# Patient Record
Sex: FEMALE | Race: WHITE | NOT HISPANIC OR LATINO | Employment: OTHER | URBAN - METROPOLITAN AREA
[De-identification: names, ages, dates, MRNs, and addresses within clinical notes are randomized per-mention and may not be internally consistent; named-entity substitution may affect disease eponyms.]

---

## 2023-08-24 ENCOUNTER — TELEPHONE (OUTPATIENT)
Dept: PRIMARY CARE | Facility: CLINIC | Age: 63
End: 2023-08-24
Payer: COMMERCIAL

## 2023-08-24 DIAGNOSIS — E55.9 VITAMIN D DEFICIENCY: ICD-10-CM

## 2023-08-24 DIAGNOSIS — Z00.00 ROUTINE GENERAL MEDICAL EXAMINATION AT A HEALTH CARE FACILITY: Primary | ICD-10-CM

## 2023-09-27 ENCOUNTER — CLINICAL SUPPORT (OUTPATIENT)
Dept: PRIMARY CARE | Facility: CLINIC | Age: 63
End: 2023-09-27
Payer: COMMERCIAL

## 2023-09-27 DIAGNOSIS — E55.9 VITAMIN D DEFICIENCY: ICD-10-CM

## 2023-09-27 DIAGNOSIS — Z00.00 ROUTINE GENERAL MEDICAL EXAMINATION AT A HEALTH CARE FACILITY: ICD-10-CM

## 2023-09-27 LAB
ALANINE AMINOTRANSFERASE (SGPT) (U/L) IN SER/PLAS: 14 U/L (ref 7–45)
ALBUMIN (G/DL) IN SER/PLAS: 4.7 G/DL (ref 3.4–5)
ALKALINE PHOSPHATASE (U/L) IN SER/PLAS: 84 U/L (ref 33–136)
ANION GAP IN SER/PLAS: 16 MMOL/L (ref 10–20)
ASPARTATE AMINOTRANSFERASE (SGOT) (U/L) IN SER/PLAS: 20 U/L (ref 9–39)
BILIRUBIN TOTAL (MG/DL) IN SER/PLAS: 0.5 MG/DL (ref 0–1.2)
CALCIDIOL (25 OH VITAMIN D3) (NG/ML) IN SER/PLAS: 41 NG/ML
CALCIUM (MG/DL) IN SER/PLAS: 10.3 MG/DL (ref 8.6–10.6)
CARBON DIOXIDE, TOTAL (MMOL/L) IN SER/PLAS: 27 MMOL/L (ref 21–32)
CHLORIDE (MMOL/L) IN SER/PLAS: 105 MMOL/L (ref 98–107)
CHOLESTEROL (MG/DL) IN SER/PLAS: 201 MG/DL (ref 0–199)
CHOLESTEROL IN HDL (MG/DL) IN SER/PLAS: 65.2 MG/DL
CHOLESTEROL/HDL RATIO: 3.1
COBALAMIN (VITAMIN B12) (PG/ML) IN SER/PLAS: 714 PG/ML (ref 211–911)
CREATININE (MG/DL) IN SER/PLAS: 0.76 MG/DL (ref 0.5–1.05)
ERYTHROCYTE DISTRIBUTION WIDTH (RATIO) BY AUTOMATED COUNT: 12.1 % (ref 11.5–14.5)
ERYTHROCYTE MEAN CORPUSCULAR HEMOGLOBIN CONCENTRATION (G/DL) BY AUTOMATED: 31.2 G/DL (ref 32–36)
ERYTHROCYTE MEAN CORPUSCULAR VOLUME (FL) BY AUTOMATED COUNT: 95 FL (ref 80–100)
ERYTHROCYTES (10*6/UL) IN BLOOD BY AUTOMATED COUNT: 4.55 X10E12/L (ref 4–5.2)
ESTIMATED AVERAGE GLUCOSE FOR HBA1C: 105 MG/DL
GFR FEMALE: 89 ML/MIN/1.73M2
GLUCOSE (MG/DL) IN SER/PLAS: 95 MG/DL (ref 74–99)
HEMATOCRIT (%) IN BLOOD BY AUTOMATED COUNT: 43.3 % (ref 36–46)
HEMOGLOBIN (G/DL) IN BLOOD: 13.5 G/DL (ref 12–16)
HEMOGLOBIN A1C/HEMOGLOBIN TOTAL IN BLOOD: 5.3 %
LDL: 112 MG/DL (ref 0–99)
LEUKOCYTES (10*3/UL) IN BLOOD BY AUTOMATED COUNT: 5.6 X10E9/L (ref 4.4–11.3)
NRBC (PER 100 WBCS) BY AUTOMATED COUNT: 0 /100 WBC (ref 0–0)
PLATELETS (10*3/UL) IN BLOOD AUTOMATED COUNT: 275 X10E9/L (ref 150–450)
POTASSIUM (MMOL/L) IN SER/PLAS: 4.9 MMOL/L (ref 3.5–5.3)
PROTEIN TOTAL: 7.1 G/DL (ref 6.4–8.2)
SODIUM (MMOL/L) IN SER/PLAS: 143 MMOL/L (ref 136–145)
THYROTROPIN (MIU/L) IN SER/PLAS BY DETECTION LIMIT <= 0.05 MIU/L: 6.48 MIU/L (ref 0.44–3.98)
THYROXINE (T4) FREE (NG/DL) IN SER/PLAS: 1.08 NG/DL (ref 0.78–1.48)
TRIGLYCERIDE (MG/DL) IN SER/PLAS: 119 MG/DL (ref 0–149)
UREA NITROGEN (MG/DL) IN SER/PLAS: 15 MG/DL (ref 6–23)
VLDL: 24 MG/DL (ref 0–40)

## 2023-09-27 PROCEDURE — 83036 HEMOGLOBIN GLYCOSYLATED A1C: CPT

## 2023-09-27 PROCEDURE — 82306 VITAMIN D 25 HYDROXY: CPT

## 2023-09-27 PROCEDURE — 80061 LIPID PANEL: CPT

## 2023-09-27 PROCEDURE — 84439 ASSAY OF FREE THYROXINE: CPT

## 2023-09-27 PROCEDURE — 84443 ASSAY THYROID STIM HORMONE: CPT

## 2023-09-27 PROCEDURE — 85027 COMPLETE CBC AUTOMATED: CPT

## 2023-09-27 PROCEDURE — 80053 COMPREHEN METABOLIC PANEL: CPT

## 2023-09-27 PROCEDURE — 82607 VITAMIN B-12: CPT

## 2023-10-02 ENCOUNTER — OFFICE VISIT (OUTPATIENT)
Dept: PRIMARY CARE | Facility: CLINIC | Age: 63
End: 2023-10-02
Payer: COMMERCIAL

## 2023-10-02 VITALS
SYSTOLIC BLOOD PRESSURE: 129 MMHG | DIASTOLIC BLOOD PRESSURE: 86 MMHG | OXYGEN SATURATION: 98 % | BODY MASS INDEX: 21.9 KG/M2 | HEART RATE: 68 BPM | WEIGHT: 119 LBS | HEIGHT: 62 IN

## 2023-10-02 DIAGNOSIS — C50.911 MALIGNANT NEOPLASM OF RIGHT FEMALE BREAST, UNSPECIFIED ESTROGEN RECEPTOR STATUS, UNSPECIFIED SITE OF BREAST (MULTI): ICD-10-CM

## 2023-10-02 DIAGNOSIS — Z00.00 ANNUAL PHYSICAL EXAM: Primary | ICD-10-CM

## 2023-10-02 DIAGNOSIS — E78.2 MODERATE MIXED HYPERLIPIDEMIA NOT REQUIRING STATIN THERAPY: ICD-10-CM

## 2023-10-02 DIAGNOSIS — Z23 ENCOUNTER FOR IMMUNIZATION: ICD-10-CM

## 2023-10-02 PROBLEM — E55.9 VITAMIN D DEFICIENCY: Status: ACTIVE | Noted: 2023-10-02

## 2023-10-02 PROBLEM — R87.619 ABNORMAL PAP SMEAR OF CERVIX: Status: ACTIVE | Noted: 2023-10-02

## 2023-10-02 PROBLEM — M54.12 CERVICAL RADICULOPATHY: Status: ACTIVE | Noted: 2023-10-02

## 2023-10-02 PROBLEM — K21.9 GERD WITHOUT ESOPHAGITIS: Status: ACTIVE | Noted: 2023-10-02

## 2023-10-02 PROBLEM — L71.9 ROSACEA, UNSPECIFIED: Status: ACTIVE | Noted: 2023-02-15

## 2023-10-02 PROBLEM — L23.1 ALLERGIC CONTACT DERMATITIS DUE TO ADHESIVES: Status: ACTIVE | Noted: 2023-02-15

## 2023-10-02 PROBLEM — R49.0 HOARSENESS, CHRONIC: Status: ACTIVE | Noted: 2023-10-02

## 2023-10-02 PROBLEM — I89.0 LYMPHEDEMA OF RIGHT ARM: Status: ACTIVE | Noted: 2023-10-02

## 2023-10-02 PROBLEM — E78.5 HYPERLIPEMIA: Status: ACTIVE | Noted: 2023-10-02

## 2023-10-02 PROCEDURE — 1036F TOBACCO NON-USER: CPT | Performed by: FAMILY MEDICINE

## 2023-10-02 PROCEDURE — 90471 IMMUNIZATION ADMIN: CPT | Performed by: FAMILY MEDICINE

## 2023-10-02 PROCEDURE — 99396 PREV VISIT EST AGE 40-64: CPT | Performed by: FAMILY MEDICINE

## 2023-10-02 PROCEDURE — 90686 IIV4 VACC NO PRSV 0.5 ML IM: CPT | Performed by: FAMILY MEDICINE

## 2023-10-02 RX ORDER — ROSUVASTATIN CALCIUM 5 MG/1
TABLET, COATED ORAL
COMMUNITY
Start: 2021-10-20 | End: 2023-10-02 | Stop reason: SDUPTHER

## 2023-10-02 RX ORDER — ROSUVASTATIN CALCIUM 5 MG/1
5 TABLET, COATED ORAL 3 TIMES WEEKLY
Qty: 39 TABLET | Refills: 3 | Status: SHIPPED | OUTPATIENT
Start: 2023-10-02

## 2023-10-02 RX ORDER — ACETAMINOPHEN 500 MG
TABLET ORAL
COMMUNITY

## 2023-10-02 RX ORDER — ANASTROZOLE 1 MG/1
TABLET ORAL
COMMUNITY
Start: 2018-05-30

## 2023-10-02 RX ORDER — CALCIUM CARBONATE 500(1250)
1 TABLET ORAL
COMMUNITY

## 2023-10-02 ASSESSMENT — COLUMBIA-SUICIDE SEVERITY RATING SCALE - C-SSRS
1. IN THE PAST MONTH, HAVE YOU WISHED YOU WERE DEAD OR WISHED YOU COULD GO TO SLEEP AND NOT WAKE UP?: NO
2. HAVE YOU ACTUALLY HAD ANY THOUGHTS OF KILLING YOURSELF?: NO

## 2023-10-02 ASSESSMENT — PATIENT HEALTH QUESTIONNAIRE - PHQ9
1. LITTLE INTEREST OR PLEASURE IN DOING THINGS: NOT AT ALL
SUM OF ALL RESPONSES TO PHQ9 QUESTIONS 1 AND 2: 0
2. FEELING DOWN, DEPRESSED OR HOPELESS: NOT AT ALL

## 2023-10-02 NOTE — PATIENT INSTRUCTIONS
Immunizations you need to get from the pharmacy: COVID booster    Scheduling Radiology tests: [phone]    If you need labs done, please go to any  lab, no paperwork needed. If a Lipid panel is ordered, you will need to fast overnight, other blood work does not require fasting.   Labs in this building are the one across the pierce (M-F 7:30-4pm) and in [address] (M-F 7:30-5:00pm and Sat 8-12pm)    Please call me if any questions arise from now until your next visit. I will call you after I am done seeing patients. A Doctor is always available by phone when the office is closed. Please feel free to call for help with any problem that you feel shouldn't wait until the office re-opens.

## 2023-10-02 NOTE — PROGRESS NOTES
"Subjective   Patient ID: Pallavi Wallace is a 62 y.o. female who presents for Annual Exam.    Last Annual Physical: Oct 2022  Last Dental Visit: Up-to-date  Last Eye exam: No recent dental visit   Hearing Concerns: No   Diet: Well- balanced   Exercise Routine: Regular exercise       HPI   The patient reports that she is taking rosuvastatin for hyperlipidemia 3 times weekly and anastrozole for her history of breast cancer. She is taking these medications as prescribed and is tolerating it well.     Review of Systems  Constitutional: No fever or chills, No Night Sweats  Eyes: No Blurry Vision or Eye sight problems  ENT: No Nasal Discharge, Hoarseness, sore throat  Cardiovascular: no chest pain, no palpitations and no syncope.   Respiratory: no cough, no shortness of breath during exertion and no shortness of breath at rest.   Gastrointestinal: no abdominal pain, no nausea and no vomiting.   : No vaginal discharge, burning with urination, no blood in urine or stools  Skin: No Skin rashes or Lesions  Neuro: No Headache, no dizziness or Numbness or tingling  Psych: No Anxiety, depression or sleeping problems  Heme: No Easy bleeding or brusing.     Objective   /86   Pulse 68   Ht 1.575 m (5' 2\")   Wt 54 kg (119 lb)   SpO2 98%   BMI 21.77 kg/m²     Physical Exam  Patient declined Chaperone  Constitutional: Alert and in no acute distress. Well developed, well nourished.   Head and Face: Head and face: Normal.    Eyes: Normal external exam. Pupils were equal in size, round, reactive to light (PERRL) with normal accommodation and extraocular movements intact (EOMI).   Ears, Nose, Mouth, and Throat: External inspection of ears and nose: Normal.  Hearing: Normal.  Nasal mucosa, septum, and turbinates: Normal.  Lips, teeth, and gums: Normal.  Oropharynx: Normal.   Neck: No neck mass was observed. Supple. Thyroid not enlarged and there were no palpable thyroid nodules.   Cardiovascular: Heart rate and rhythm were " normal, normal S1 and S2. Pedal pulses: Normal. No peripheral edema.   Pulmonary: No respiratory distress. Clear bilateral breath sounds.   Abdomen: Soft nontender; no abdominal mass palpated. Normal bowel sounds. No organomegaly.   Musculoskeletal: No joint swelling seen, normal movements of all extremities. Range of motion: Normal.  Muscle strength/tone: Normal.    Skin: Normal skin color and pigmentation, normal skin turgor, and no rash.   Neurologic: Deep tendon reflexes were 2+ and symmetric.   Psychiatric: Judgment and insight: Intact. Mood and affect: Normal.  Lymphatic: No cervical lymphadenopathy. Palpation of lymph nodes in axillae: Normal.  Palpation of lymph nodes in groin: Normal.    Lab Results   Component Value Date    WBC 5.6 09/27/2023    HGB 13.5 09/27/2023    HCT 43.3 09/27/2023     09/27/2023    CHOL 201 (H) 09/27/2023    TRIG 119 09/27/2023    HDL 65.2 09/27/2023    ALT 14 09/27/2023    AST 20 09/27/2023     09/27/2023    K 4.9 09/27/2023     09/27/2023    CREATININE 0.76 09/27/2023    BUN 15 09/27/2023    CO2 27 09/27/2023    TSH 6.48 (H) 09/27/2023    HGBA1C 5.3 09/27/2023       XR DEXA bone density  Narrative: Interpreted By:  JAZMINE FLORES DO  MRN: [id number]  Patient Name: ADDY GOODRICH     STUDY:  BONE DENSITY, DEXA 1 OR MORE SITES: AXIAL SKELETN8/25/2023 2:16 pm     INDICATION:  hx breast cancer, on aromatase inhibitor, osteopenia  M85.80:  Osteopenia C50.311: Malignant neoplasm of lower-inner quadrant of  right breast of female, estrogen receptor positive Z51.81: Encounter  for monitoring aromatase inhibitor therapy. The patient is a 63 y/o  year old F.     COMPARISON:  None.     ACCESSION NUMBER(S):  [id number]     ORDERING CLINICIAN:  NISREEN HARVEY     TECHNIQUE:  BONE DENSITY, DEXA 1 OR MORE SITES: AXIAL SKELETN     FINDINGS:  SPINE L1-L4  Bone Mineral Density: 0.903  T-Score -1.3  Z-Score 0.2  Classification:  Osteopenia  Bone Mineral Density change vs baseline:  Not  reported  Bone Mineral Density change vs previous: Not reported     LEFT FEMUR -TOTAL  Bone Mineral Density: 0.777  T-Score -1.4   Z-Score  -0.3  Classification:  Osteopenia  Bone Mineral Density change vs baseline: Not reported  Bone Mineral Density change vs previous: Not reported     LEFT FEMUR -NECK  Bone Mineral Density: 0.715  T-Score -1.2  Z-Score 0.2  Classification:  Osteopenia        World Health Organization (WHO) criteria for post-menopausal,   Women:  Normal:         T-score at or above -1 SD  Osteopenia:   T-score between -1 and -2.5 SD  Osteoporosis: T-score at or below -2.5 SD        10-year Fracture Risk:  Major Osteoporotic Fracture  7.1 %  Hip Fracture                        0.5 %     Note:  If no FRAX score is reported, it is because:  Some T-score for Spine Total or Hip Total or Femoral Neck at or below  -2.5     This exam was performed at [facility] on a [facility] Dexa Unit.     Impression: DEXA:  According to World Health Organization criteria,  classification is  low bone mass (osteopenia)     Followup recommended in two years or sooner as clinically warranted.     All images and detailed analysis are available on the  Radiology  PACS.     MACRO:  None      Assessment/Plan   Diagnoses and all orders for this visit:  Annual physical exam  Malignant neoplasm of right female breast, unspecified estrogen receptor status, unspecified site of breast (CMS/[facility])  Encounter for immunization  -     Flu vaccine (IIV4) age 6 months and greater, preservative free  Moderate mixed hyperlipidemia not requiring statin therapy  -     CT cardiac scoring wo IV contrast; Future  -     Lipoprotein a; Future  -     rosuvastatin (Crestor) 5 mg tablet; Take 1 tablet (5 mg) by mouth 3 (three) times a week.        Dear Pallavi Wallace     It was my pleasure to take care of you today in the office. Below are the things we discussed today:    1. 1. Immunizations: Yearly Flu shot is  recommended. Given today          a: COVID: Please get booster from the pharmacy          b: Tetanus: Up-to-date         c: Shingrix: Up-to-date    2. Blood Work: Reviewed   3. Seen your dentist twice a year  4. Yearly Eye exam is recommended    5. BMI: Normal  6: Diet recommendations:   Eat Clean, Try to have as many home cooked meals as possible  Avoid processed foods which contain excess calories, sugar, and sodium.    7. Exercise recommendations:   150 minutes a week to maintain your weight     If you have to loose weight, you need a better diet and exercise plan.     8. Supplements recommended:  a - Calcium 600 mg up to twice a day to get a total of 1200 mg. Each 8 oz of milk or yogurt or 1 oz of cheese, 1 Banana, 1 serving of green Leafy vegetable has about 300 mg of Calcium, so you may subtract that amount. Calcium citrate is the only acceptable supplement to take if you take an acid suppressing medication like Prilosec; otherwise Calcium carbonate is acceptable too (It can cause Constipation).   b - Vitamin D - 2000 IU daily     9. Please get your Living will / Advance directive completed if you do not have one already. Please make sure our office has a copy of the latest one.     10. Colonoscopy: Uptodate. Last done in Feb 2022, repeat in Feb 2032   11. Mammogram: Uptodate. The patient is following up with Breast center.  12. PAP: Abnormal PAP. The patient will follow up with GYN   13. DEXA: Up-to-date   14: Skin Check: Please see Dermatology once a year for a Skin Check.     15. Hyperlipidemia: Hold rosuvastatin for 2 weeks prior to getting labs done. Lipoprotein A ordered. CT Cardiac scoring ordered.    16. History of breast cancer: Continue anastrozole.     17. Osteopenia: Advised the patient to do strength training exercises and take over-the-counter calcium supplement.     Follow up in one year for a Physical. Please call the office before your Physical to see if you need blood work completed prior to  your physical.     Please call me if any questions arise from now until your next visit. I will call you after I am done seeing patients. A Doctor is always available by phone when the office is closed. Please feel free to call for help with any problem that you feel shouldn't wait until the office re-opens.     Scribe Attestation  By signing my name below, I, Shahana West, Cassandra   attest that this documentation has been prepared under the direction and in the presence of Rachel Reed MD.

## 2023-10-16 ENCOUNTER — HOSPITAL ENCOUNTER (OUTPATIENT)
Dept: RADIOLOGY | Facility: HOSPITAL | Age: 63
Discharge: HOME | End: 2023-10-16
Payer: COMMERCIAL

## 2023-10-16 DIAGNOSIS — E78.2 MODERATE MIXED HYPERLIPIDEMIA NOT REQUIRING STATIN THERAPY: ICD-10-CM

## 2023-10-19 ENCOUNTER — APPOINTMENT (OUTPATIENT)
Dept: RADIOLOGY | Facility: HOSPITAL | Age: 63
End: 2023-10-19
Payer: COMMERCIAL

## 2023-10-20 PROBLEM — K21.9 LPRD (LARYNGOPHARYNGEAL REFLUX DISEASE): Status: ACTIVE | Noted: 2023-10-20

## 2023-10-20 PROBLEM — J38.2 VOCAL CORD NODULE: Status: ACTIVE | Noted: 2023-10-20

## 2023-10-20 PROBLEM — N95.2 VAGINAL ATROPHY: Status: ACTIVE | Noted: 2023-10-20

## 2023-10-20 RX ORDER — TRETINOIN 0.25 MG/G
CREAM TOPICAL NIGHTLY
COMMUNITY
End: 2023-10-24 | Stop reason: DRUGHIGH

## 2023-10-23 ENCOUNTER — OFFICE VISIT (OUTPATIENT)
Dept: OBSTETRICS AND GYNECOLOGY | Facility: CLINIC | Age: 63
End: 2023-10-23
Payer: COMMERCIAL

## 2023-10-23 VITALS
HEIGHT: 62 IN | BODY MASS INDEX: 22.45 KG/M2 | DIASTOLIC BLOOD PRESSURE: 58 MMHG | WEIGHT: 122 LBS | SYSTOLIC BLOOD PRESSURE: 110 MMHG

## 2023-10-23 DIAGNOSIS — Z01.411 ENCNTR FOR GYN EXAM (GENERAL) (ROUTINE) W ABNORMAL FINDINGS: Primary | ICD-10-CM

## 2023-10-23 DIAGNOSIS — Z12.4 SCREENING FOR MALIGNANT NEOPLASM OF CERVIX: ICD-10-CM

## 2023-10-23 DIAGNOSIS — N95.8 GENITOURINARY SYNDROME OF MENOPAUSE: ICD-10-CM

## 2023-10-23 PROCEDURE — 99396 PREV VISIT EST AGE 40-64: CPT | Performed by: NURSE PRACTITIONER

## 2023-10-23 PROCEDURE — 1036F TOBACCO NON-USER: CPT | Performed by: NURSE PRACTITIONER

## 2023-10-23 PROCEDURE — 88175 CYTOPATH C/V AUTO FLUID REDO: CPT

## 2023-10-23 PROCEDURE — 87624 HPV HI-RISK TYP POOLED RSLT: CPT

## 2023-10-23 RX ORDER — ESTRADIOL 0.1 MG/G
CREAM VAGINAL
Qty: 42.5 G | Refills: 3 | Status: SHIPPED | OUTPATIENT
Start: 2023-10-23

## 2023-10-23 NOTE — PROGRESS NOTES
63 y.o.  female presents for annual well woman exam.    Denies abnormal bleeding. Reports periodic hot flashes, night sweats, but tolerable. Never on HRT.   Sexually active with male partner.  Denies dyspareunia.   Reports vaginal dryness, uses OTC lubricant with effect.   Declines STI testing.   She denies any breast changes. Hx of lumpectomy.   Pap hx.  LEEP 2022, due for pap today  Denies pelvic pain.   Denies abnormal vaginal discharge, itching, odor.   Endorses urinary leakage. States she is going to try Bloom.   She is a non-smoker

## 2023-10-23 NOTE — PROGRESS NOTES
Pallavi is a 64yo here today for annual exam      Hx significant for breast cancer and VASQUEZ 2-3 on LEEP    Pap hx:  2022: ASC-US, HPV neg  2022: LEEP VASQUEZ 2-3  2021: ASC-H  2019: ASC-US, HPV 16/other pos, Colpo No VASQUEZ  0549-8010: ASC-US, HPV neg  2008: NILM, HPV neg    Discussed hyst last year with Dr. Martinez    Physical Exam  Constitutional:       Appearance: Normal appearance.   Genitourinary:      Bladder and rectum normal.      Right Labia: No skin changes or Bartholin's cyst.     Left Labia: No skin changes or Bartholin's cyst.     Vulva exam comments: Normal.      No vaginal prolapse present.     Moderate vaginal atrophy present.     Vaginal exam comments: Normal.   Breasts:     Breasts are soft.     Right: Normal.      Left: Normal.   HENT:      Head: Normocephalic.   Pulmonary:      Effort: Pulmonary effort is normal.   Abdominal:      General: There is no distension.      Palpations: Abdomen is soft.   Musculoskeletal:         General: Normal range of motion.      Cervical back: Normal range of motion and neck supple.   Neurological:      General: No focal deficit present.      Mental Status: She is alert and oriented to person, place, and time.   Skin:     General: Skin is warm and dry.   Psychiatric:         Mood and Affect: Mood normal.         Behavior: Behavior normal.         Thought Content: Thought content normal.         Judgment: Judgment normal.   Vitals and nursing note reviewed.        Diagnoses and all orders for this visit:  Encntr for gyn exam (general) (routine) w abnormal findings  Genitourinary syndrome of menopause  -     estradiol (Estrace) 0.01 % (0.1 mg/gram) vaginal cream; Insert pea sized amount into vagina twice weekly  Screening for malignant neoplasm of cervix  -     THINPREP PAP

## 2023-10-24 ENCOUNTER — OFFICE VISIT (OUTPATIENT)
Dept: DERMATOLOGY | Facility: CLINIC | Age: 63
End: 2023-10-24
Payer: COMMERCIAL

## 2023-10-24 DIAGNOSIS — Z12.83 SCREENING EXAM FOR SKIN CANCER: ICD-10-CM

## 2023-10-24 DIAGNOSIS — L82.1 SEBORRHEIC KERATOSIS: ICD-10-CM

## 2023-10-24 DIAGNOSIS — L71.9 ROSACEA: ICD-10-CM

## 2023-10-24 DIAGNOSIS — D22.9 MULTIPLE BENIGN NEVI: Primary | ICD-10-CM

## 2023-10-24 DIAGNOSIS — L81.4 LENTIGO: ICD-10-CM

## 2023-10-24 DIAGNOSIS — L30.9 DERMATITIS: ICD-10-CM

## 2023-10-24 PROCEDURE — 1036F TOBACCO NON-USER: CPT | Performed by: DERMATOLOGY

## 2023-10-24 PROCEDURE — 99213 OFFICE O/P EST LOW 20 MIN: CPT | Performed by: DERMATOLOGY

## 2023-10-24 RX ORDER — TRETINOIN 0.5 MG/G
CREAM TOPICAL NIGHTLY
Qty: 45 G | Refills: 3 | Status: SHIPPED | OUTPATIENT
Start: 2023-10-24 | End: 2024-10-23

## 2023-10-24 ASSESSMENT — DERMATOLOGY PATIENT ASSESSMENT
ARE YOU TRYING TO GET PREGNANT: NO
HAVE YOU HAD OR DO YOU HAVE VASCULAR DISEASE: NO
DO YOU USE A TANNING BED: NO
HAVE YOU HAD OR DO YOU HAVE A STAPH INFECTION: NO
DO YOU HAVE IRREGULAR MENSTRUAL CYCLES: NO
ARE YOU ON BIRTH CONTROL: NO
DO YOU USE SUNSCREEN: DAILY
DO YOU HAVE ANY NEW OR CHANGING LESIONS: NO
ARE YOU AN ORGAN TRANSPLANT RECIPIENT: NO

## 2023-10-24 ASSESSMENT — ENCOUNTER SYMPTOMS
SWEATS: 0
CHILLS: 0
WEIGHT LOSS: 0
SHORTNESS OF BREATH: 0
WHEEZING: 0
SORE THROAT: 0
HEADACHES: 0
FEVER: 0
COUGH: 1
RHINORRHEA: 0
HEMOPTYSIS: 0
MYALGIAS: 1
HEARTBURN: 0

## 2023-10-24 ASSESSMENT — DERMATOLOGY QUALITY OF LIFE (QOL) ASSESSMENT
RATE HOW BOTHERED YOU ARE BY EFFECTS OF YOUR SKIN PROBLEMS ON YOUR ACTIVITIES (EG, GOING OUT, ACCOMPLISHING WHAT YOU WANT, WORK ACTIVITIES OR YOUR RELATIONSHIPS WITH OTHERS): 0 - NEVER BOTHERED
DATE THE QUALITY-OF-LIFE ASSESSMENT WAS COMPLETED: 66771
RATE HOW EMOTIONALLY BOTHERED YOU ARE BY YOUR SKIN PROBLEM (FOR EXAMPLE, WORRY, EMBARRASSMENT, FRUSTRATION): 0 - NEVER BOTHERED
ARE THERE EXCLUSIONS OR EXCEPTIONS FOR THE QUALITY OF LIFE ASSESSMENT: NO
RATE HOW BOTHERED YOU ARE BY SYMPTOMS OF YOUR SKIN PROBLEM (EG, ITCHING, STINGING BURNING, HURTING OR SKIN IRRITATION): 0 - NEVER BOTHERED

## 2023-10-24 ASSESSMENT — ITCH NUMERIC RATING SCALE: HOW SEVERE IS YOUR ITCHING?: 0

## 2023-10-24 ASSESSMENT — PATIENT GLOBAL ASSESSMENT (PGA): PATIENT GLOBAL ASSESSMENT: PATIENT GLOBAL ASSESSMENT:  1 - CLEAR

## 2023-10-24 NOTE — PROGRESS NOTES
Subjective     Pallavi Wallace is a 63 y.o. female who presents for the following: Skin Check.     Review of Systems:  No other skin or systemic complaints other than what is documented elsewhere in the note.    The following portions of the chart were reviewed this encounter and updated as appropriate:         Skin Cancer History  No skin cancer on file.      Specialty Problems          Dermatology Problems    Rosacea, unspecified        Objective   Well appearing patient in no apparent distress; mood and affect are within normal limits.    A full examination was performed including scalp, head, eyes, ears, nose, lips, neck, chest, axillae, abdomen, back, buttocks, bilateral upper extremities, bilateral lower extremities, hands, feet, fingers, toes, fingernails, and toenails. All findings within normal limits unless otherwise noted below.    Assessment/Plan   1. Multiple benign nevi  Scattered, uniform and benign-appearing, regular brown melanocytic papules and macules.    - Discussed benign nature and that no treatment is necessary unless it becomes painful or increases in size. Patient opts for clinical monitoring at this time.     2. Seborrheic keratosis  Stuck on verrucous, tan-brown papules and plaques.      - Discussed benign nature and that no treatment is necessary unless it becomes painful or increases in size. Patient opts for clinical monitoring at this time.     3. Lentigo  Scattered tan macules in sun-exposed areas.    - Discussed benign nature and that no treatment is necessary unless it becomes painful or increases in size. Patient opts for clinical monitoring at this time.     4. Screening exam for skin cancer    Full body skin exam  -No lesions concerning for malignancy on the remainder the skin exam today   - The ugly duckling sign was discussed. Monitor for any skin lesions that are different in color, shape, or size than others on body  -Sun protection was discussed. Recommend SPF 30+, hats  with brims, sun protective clothing, and avoiding sun exposure between 10 AM and 2 PM whenever possible  -Recommend regular skin exams or sooner if new or changing lesions       5. Dermatitis  Light pink macules coalescing into symmetric patches mostlyin intertriginous places in antecubital fossae, inverse on trunk, neck. Covering >30% BSA    Not itchy  Just started today after playing Ellie ball  Happened once before several months ago, resolved after 1-2 days spontaneously    No sick symptoms  Reviewed medicatons/supplements that she takes sporadically  Took advil, took a mushroom containing supplement        1 year FULL SKIN EXAM   Refills OK 1 year

## 2023-10-25 ENCOUNTER — OFFICE VISIT (OUTPATIENT)
Dept: PRIMARY CARE | Facility: CLINIC | Age: 63
End: 2023-10-25
Payer: COMMERCIAL

## 2023-10-25 VITALS
HEIGHT: 62 IN | DIASTOLIC BLOOD PRESSURE: 74 MMHG | HEART RATE: 85 BPM | BODY MASS INDEX: 21.53 KG/M2 | SYSTOLIC BLOOD PRESSURE: 125 MMHG | WEIGHT: 117 LBS | OXYGEN SATURATION: 95 %

## 2023-10-25 DIAGNOSIS — R05.8 POST-VIRAL COUGH SYNDROME: Primary | ICD-10-CM

## 2023-10-25 PROCEDURE — 1036F TOBACCO NON-USER: CPT | Performed by: FAMILY MEDICINE

## 2023-10-25 PROCEDURE — 99213 OFFICE O/P EST LOW 20 MIN: CPT | Performed by: FAMILY MEDICINE

## 2023-10-25 RX ORDER — BENZONATATE 100 MG/1
100 CAPSULE ORAL 3 TIMES DAILY PRN
Qty: 42 CAPSULE | Refills: 0 | Status: SHIPPED | OUTPATIENT
Start: 2023-10-25 | End: 2023-11-24

## 2023-10-25 ASSESSMENT — ENCOUNTER SYMPTOMS
COUGH: 1
RHINORRHEA: 0
SWEATS: 0
FEVER: 0
MYALGIAS: 1
HEARTBURN: 0
CHILLS: 0
WEIGHT LOSS: 0
HEADACHES: 0
WHEEZING: 0
SORE THROAT: 0
SHORTNESS OF BREATH: 0
HEMOPTYSIS: 0

## 2023-10-25 NOTE — PROGRESS NOTES
"Subjective   Patient ID: Pallavi Wallace is a 63 y.o. female who presents for Cough.    Cough  This is a recurrent problem. The current episode started 1 to 4 weeks ago. The problem has been gradually worsening. The problem occurs every few minutes. The cough is Non-productive. Associated symptoms include myalgias and a rash. Pertinent negatives include no chest pain, chills, ear congestion, ear pain, fever, headaches, heartburn, hemoptysis, nasal congestion, postnasal drip, rhinorrhea, sore throat, shortness of breath, sweats, weight loss or wheezing.      The patient states that she has been experiencing a worsening cough for the past 3-4 weeks. She did a COVID test 1 day ago which showed negative results. She also noticed a body rash 1 day ago and denies having any nasal discharge. She experienced no shortness-of-breath while playing tennis 1 day ago.    Review of Systems  Constitutional: No fever or chills  Cardiovascular: no chest pain, no palpitations and no syncope.   Respiratory: + cough, no shortness of breath during exertion and no shortness of breath at rest.   Gastrointestinal: no abdominal pain, no nausea and no vomiting.  Neuro: No Headache, no dizziness  Skin: + rash    Objective   /74   Pulse 85   Ht 1.575 m (5' 2\")   Wt 53.1 kg (117 lb)   SpO2 95%   BMI 21.40 kg/m²     Physical Exam  Constitutional: Alert and in no acute distress. Well developed, well nourished  Head and Face: Head and face: Normal.    Cardiovascular: Heart rate and rhythm were normal, normal S1 and S2. No peripheral edema.   Pulmonary: No respiratory distress. Clear bilateral breath sounds.  Musculoskeletal: Gait and station: Normal. Muscle strength/tone: Normal.   Skin: Normal skin color and pigmentation, normal skin turgor, and no rash.    Psychiatric: Judgment and insight: Intact. Mood and affect: Normal.    Lab Results   Component Value Date    WBC 5.6 09/27/2023    HGB 13.5 09/27/2023    HCT 43.3 09/27/2023    PLT " 275 09/27/2023    CHOL 201 (H) 09/27/2023    TRIG 119 09/27/2023    HDL 65.2 09/27/2023    ALT 14 09/27/2023    AST 20 09/27/2023     09/27/2023    K 4.9 09/27/2023     09/27/2023    CREATININE 0.76 09/27/2023    BUN 15 09/27/2023    CO2 27 09/27/2023    TSH 6.48 (H) 09/27/2023    HGBA1C 5.3 09/27/2023       XR DEXA bone density  Narrative: Interpreted By:  JAZMINE FLORES DO  MRN: [id number]  Patient Name: ADDY GOODRICH     STUDY:  BONE DENSITY, DEXA 1 OR MORE SITES: AXIAL SKELETN8/25/2023 2:16 pm     INDICATION:  hx breast cancer, on aromatase inhibitor, osteopenia  M85.80:  Osteopenia C50.311: Malignant neoplasm of lower-inner quadrant of  right breast of female, estrogen receptor positive Z51.81: Encounter  for monitoring aromatase inhibitor therapy. The patient is a 61 y/o  year old F.     COMPARISON:  None.     ACCESSION NUMBER(S):  [id number]     ORDERING CLINICIAN:  NISREEN HARVEY     TECHNIQUE:  BONE DENSITY, DEXA 1 OR MORE SITES: AXIAL SKELETN     FINDINGS:  SPINE L1-L4  Bone Mineral Density: 0.903  T-Score -1.3  Z-Score 0.2  Classification:  Osteopenia  Bone Mineral Density change vs baseline:  Not reported  Bone Mineral Density change vs previous: Not reported     LEFT FEMUR -TOTAL  Bone Mineral Density: 0.777  T-Score -1.4   Z-Score  -0.3  Classification:  Osteopenia  Bone Mineral Density change vs baseline: Not reported  Bone Mineral Density change vs previous: Not reported     LEFT FEMUR -NECK  Bone Mineral Density: 0.715  T-Score -1.2  Z-Score 0.2  Classification:  Osteopenia        World Health Organization (WHO) criteria for post-menopausal,   Women:  Normal:         T-score at or above -1 SD  Osteopenia:   T-score between -1 and -2.5 SD  Osteoporosis: T-score at or below -2.5 SD        10-year Fracture Risk:  Major Osteoporotic Fracture  7.1 %  Hip Fracture                        0.5 %     Note:  If no FRAX score is reported, it is because:  Some T-score for Spine Total or Hip  Total or Femoral Neck at or below  -2.5     This exam was performed at [facility] on a [employer]  Discovery C Dexa Unit.     Impression: DEXA:  According to World Health Organization criteria,  classification is  low bone mass (osteopenia)     Followup recommended in two years or sooner as clinically warranted.     All images and detailed analysis are available on the  Radiology  PACS.     MACRO:  None      Assessment/Plan   Diagnoses and all orders for this visit:  Post-viral cough syndrome  -     benzonatate (Tessalon) 100 mg capsule; Take 1 capsule (100 mg) by mouth 3 times a day as needed for cough. Do not crush or chew.        Dear Pallavi Wallace     It was my pleasure to take care of you today in the office. Below are the things we discussed today:    1. Post viral cough: Start tessalon pills. Recommended that the patient take over-the-counter Musinex or antihistamine. Advised the patient that if her symptoms worsen please let me know she will start an antibiotic.     Your yearly Physical is due in: Oct 2024   When you call the office for your yearly Physical, please ask them to inform me to order your blood work, so that you can get the fasting blood work before your appointment and we can discuss the results at your physical.      Please call me if any questions arise from now until your next visit. I will call you after I am done seeing patients. A Doctor is always available by phone when the office is closed. Please feel free to call for help with any problem that you feel shouldn't wait until the office re-opens.     Scribe Attestation  By signing my name below, IShahana Scribe   attest that this documentation has been prepared under the direction and in the presence of Rachel Reed MD.

## 2023-10-26 ENCOUNTER — HOSPITAL ENCOUNTER (OUTPATIENT)
Dept: RADIOLOGY | Facility: HOSPITAL | Age: 63
Discharge: HOME | End: 2023-10-26
Payer: COMMERCIAL

## 2023-10-26 PROCEDURE — 75571 CT HRT W/O DYE W/CA TEST: CPT

## 2023-10-27 DIAGNOSIS — R91.1 INCIDENTAL LUNG NODULE, GREATER THAN OR EQUAL TO 8MM: Primary | ICD-10-CM

## 2023-10-31 ENCOUNTER — TELEPHONE (OUTPATIENT)
Dept: PRIMARY CARE | Facility: CLINIC | Age: 63
End: 2023-10-31
Payer: COMMERCIAL

## 2023-10-31 DIAGNOSIS — R05.8 POST-VIRAL COUGH SYNDROME: Primary | ICD-10-CM

## 2023-10-31 RX ORDER — HYDROCODONE BITARTRATE AND HOMATROPINE METHYLBROMIDE ORAL SOLUTION 5; 1.5 MG/5ML; MG/5ML
5 LIQUID ORAL EVERY 6 HOURS PRN
Qty: 100 ML | Refills: 0 | Status: SHIPPED | OUTPATIENT
Start: 2023-10-31 | End: 2023-11-05

## 2023-11-02 ENCOUNTER — HOSPITAL ENCOUNTER (OUTPATIENT)
Dept: RADIOLOGY | Facility: HOSPITAL | Age: 63
Discharge: HOME | End: 2023-11-02
Payer: COMMERCIAL

## 2023-11-02 ENCOUNTER — APPOINTMENT (OUTPATIENT)
Dept: RADIOLOGY | Facility: HOSPITAL | Age: 63
End: 2023-11-02
Payer: COMMERCIAL

## 2023-11-02 DIAGNOSIS — R91.1 INCIDENTAL LUNG NODULE, GREATER THAN OR EQUAL TO 8MM: ICD-10-CM

## 2023-11-02 PROCEDURE — 71250 CT THORAX DX C-: CPT

## 2023-11-02 PROCEDURE — 71250 CT THORAX DX C-: CPT | Performed by: STUDENT IN AN ORGANIZED HEALTH CARE EDUCATION/TRAINING PROGRAM

## 2023-11-07 ENCOUNTER — PATIENT MESSAGE (OUTPATIENT)
Dept: PRIMARY CARE | Facility: CLINIC | Age: 63
End: 2023-11-07
Payer: COMMERCIAL

## 2023-11-07 DIAGNOSIS — R91.1 LUNG NODULE SEEN ON IMAGING STUDY: Primary | ICD-10-CM

## 2023-11-09 ENCOUNTER — TELEPHONE (OUTPATIENT)
Dept: PRIMARY CARE | Facility: CLINIC | Age: 63
End: 2023-11-09

## 2023-11-14 LAB
CYTOLOGY CMNT CVX/VAG CYTO-IMP: NORMAL
HPV HR 12 DNA GENITAL QL NAA+PROBE: NEGATIVE
HPV HR GENOTYPES PNL CVX NAA+PROBE: NEGATIVE
HPV16 DNA SPEC QL NAA+PROBE: NEGATIVE
HPV18 DNA SPEC QL NAA+PROBE: NEGATIVE
LAB AP HPV GENOTYPE QUESTION: YES
LAB AP HPV HR: NORMAL
LAB AP TREATMENT HISTORY: NORMAL
LABORATORY COMMENT REPORT: NORMAL
PATH REPORT.TOTAL CANCER: NORMAL

## 2023-11-30 ENCOUNTER — ANCILLARY PROCEDURE (OUTPATIENT)
Dept: RADIOLOGY | Facility: CLINIC | Age: 63
End: 2023-11-30
Payer: COMMERCIAL

## 2023-11-30 DIAGNOSIS — C50.911 MALIGNANT NEOPLASM OF UNSPECIFIED SITE OF RIGHT FEMALE BREAST (MULTI): ICD-10-CM

## 2023-11-30 PROCEDURE — 77063 BREAST TOMOSYNTHESIS BI: CPT

## 2023-11-30 PROCEDURE — 77063 BREAST TOMOSYNTHESIS BI: CPT | Mod: BILATERAL PROCEDURE | Performed by: RADIOLOGY

## 2023-11-30 PROCEDURE — 77067 SCR MAMMO BI INCL CAD: CPT | Mod: BILATERAL PROCEDURE | Performed by: RADIOLOGY

## 2023-12-07 ENCOUNTER — APPOINTMENT (OUTPATIENT)
Dept: OPHTHALMOLOGY | Facility: CLINIC | Age: 63
End: 2023-12-07
Payer: COMMERCIAL

## 2023-12-26 ENCOUNTER — OFFICE VISIT (OUTPATIENT)
Dept: OPHTHALMOLOGY | Facility: CLINIC | Age: 63
End: 2023-12-26
Payer: COMMERCIAL

## 2023-12-26 DIAGNOSIS — H52.03 HYPEROPIA OF BOTH EYES: ICD-10-CM

## 2023-12-26 DIAGNOSIS — H04.123 DRY EYES: Primary | ICD-10-CM

## 2023-12-26 DIAGNOSIS — H52.4 PRESBYOPIA: ICD-10-CM

## 2023-12-26 DIAGNOSIS — H25.13 NUCLEAR SCLEROTIC CATARACT OF BOTH EYES: ICD-10-CM

## 2023-12-26 DIAGNOSIS — H52.223 REGULAR ASTIGMATISM OF BOTH EYES: ICD-10-CM

## 2023-12-26 PROCEDURE — 92015 DETERMINE REFRACTIVE STATE: CPT | Performed by: OPHTHALMOLOGY

## 2023-12-26 PROCEDURE — 92004 COMPRE OPH EXAM NEW PT 1/>: CPT | Performed by: OPHTHALMOLOGY

## 2023-12-26 ASSESSMENT — VISUAL ACUITY
METHOD: SNELLEN - LINEAR
OD_SC: 20/20
OS_SC: 20/20

## 2023-12-26 ASSESSMENT — REFRACTION_MANIFEST
OD_ADD: +2.50
OD_CYLINDER: -1.25
OS_SPHERE: +1.00
OS_SPHERE: +0.75
METHOD_AUTOREFRACTION: 1
OS_CYLINDER: -1.00
OS_AXIS: 095
OS_AXIS: 095
OS_ADD: +2.50
OD_AXIS: 105
OS_CYLINDER: -1.25
OD_SPHERE: +1.00
OD_CYLINDER: -1.00
OD_AXIS: 105
OD_SPHERE: +0.75

## 2023-12-26 ASSESSMENT — EXTERNAL EXAM - RIGHT EYE: OD_EXAM: NORMAL

## 2023-12-26 ASSESSMENT — EXTERNAL EXAM - LEFT EYE: OS_EXAM: NORMAL

## 2023-12-26 ASSESSMENT — SLIT LAMP EXAM - LIDS
COMMENTS: NORMAL
COMMENTS: NORMAL

## 2023-12-26 ASSESSMENT — TONOMETRY
OD_IOP_MMHG: 14
OS_IOP_MMHG: 14
IOP_METHOD: GOLDMANN APPLANATION

## 2023-12-26 ASSESSMENT — CUP TO DISC RATIO
OD_RATIO: 0.3
OS_RATIO: 0.3

## 2023-12-26 NOTE — PROGRESS NOTES
Assessment/Plan   Diagnoses and all orders for this visit:  Dry eyes  -Start artificial tears both eyes (OU) qid  -stress compliance    Hyperopia of both eyes  Regular astigmatism of both eyes  Presbyopia  Refractive error  -give Rx for new glasses   Or pt may continue to use OTC readers    Nuclear sclerotic cataract of both eyes  -recommended UV blocking sunglasses while outdoors    Return for a dilated exam in   12  months or sooner if having any problems

## 2024-01-02 ENCOUNTER — APPOINTMENT (OUTPATIENT)
Dept: PULMONOLOGY | Facility: CLINIC | Age: 64
End: 2024-01-02
Payer: COMMERCIAL

## 2024-01-02 ENCOUNTER — APPOINTMENT (OUTPATIENT)
Dept: OPHTHALMOLOGY | Facility: CLINIC | Age: 64
End: 2024-01-02
Payer: COMMERCIAL

## 2024-01-03 ENCOUNTER — OFFICE VISIT (OUTPATIENT)
Dept: PRIMARY CARE | Facility: CLINIC | Age: 64
End: 2024-01-03
Payer: COMMERCIAL

## 2024-01-03 VITALS
SYSTOLIC BLOOD PRESSURE: 130 MMHG | HEIGHT: 62 IN | DIASTOLIC BLOOD PRESSURE: 77 MMHG | BODY MASS INDEX: 21.53 KG/M2 | HEART RATE: 88 BPM | WEIGHT: 117 LBS | OXYGEN SATURATION: 97 %

## 2024-01-03 DIAGNOSIS — F51.01 PRIMARY INSOMNIA: Primary | ICD-10-CM

## 2024-01-03 PROCEDURE — 1036F TOBACCO NON-USER: CPT | Performed by: FAMILY MEDICINE

## 2024-01-03 PROCEDURE — 99213 OFFICE O/P EST LOW 20 MIN: CPT | Performed by: FAMILY MEDICINE

## 2024-01-03 RX ORDER — TRAZODONE HYDROCHLORIDE 50 MG/1
50 TABLET ORAL NIGHTLY PRN
Qty: 90 TABLET | Refills: 2 | Status: SHIPPED | OUTPATIENT
Start: 2024-01-03 | End: 2024-03-20 | Stop reason: SDUPTHER

## 2024-01-03 NOTE — PROGRESS NOTES
"Subjective   Patient ID: Pallavi Wallace is a 63 y.o. female who presents for Sleeping Problem ([facility] Med).    HPI   The patient reports that she is having sleeping issues and tried taking trazodone which she got from her sister. This helped significantly and is interested in starting this medication. She complains of a chronic cough which started 3 months ago.     Review of Systems  Constitutional: No fever or chills  Cardiovascular: no chest pain, no palpitations and no syncope.   Respiratory: + cough, no shortness of breath during exertion and no shortness of breath at rest.   Gastrointestinal: no abdominal pain, no nausea and no vomiting.  Neuro: No Headache, no dizziness    Objective   /77   Pulse 88   Ht 1.575 m (5' 2\")   Wt 53.1 kg (117 lb)   SpO2 97%   BMI 21.40 kg/m²     Physical Exam  Constitutional: Alert and in no acute distress. Well developed, well nourished  Head and Face: Head and face: Normal.    Cardiovascular: Heart rate and rhythm were normal, normal S1 and S2. No peripheral edema.   Pulmonary: No respiratory distress. Clear bilateral breath sounds.  Musculoskeletal: Gait and station: Normal. Muscle strength/tone: Normal.   Skin: Normal skin color and pigmentation, normal skin turgor, and no rash.    Psychiatric: Judgment and insight: Intact. Mood and affect: Normal.    Lab Results   Component Value Date    WBC 5.6 09/27/2023    HGB 13.5 09/27/2023    HCT 43.3 09/27/2023     09/27/2023    CHOL 201 (H) 09/27/2023    TRIG 119 09/27/2023    HDL 65.2 09/27/2023    ALT 14 09/27/2023    AST 20 09/27/2023     09/27/2023    K 4.9 09/27/2023     09/27/2023    CREATININE 0.76 09/27/2023    BUN 15 09/27/2023    CO2 27 09/27/2023    TSH 6.48 (H) 09/27/2023    HGBA1C 5.3 09/27/2023       BI mammo bilateral screening tomosynthesis  Narrative: Interpreted By:  Sanket Delgado,   STUDY:  BI MAMMO BILATERAL SCREENING TOMOSYNTHESIS; 11/30/2023 7:45 am      ACCESSION " NUMBER(S):  [id number]      ORDERING CLINICIAN:  DENEEN VILLEGAS      INDICATION:  Screening. History of right lumpectomy with radiation.      COMPARISON:  09/01/2021, 09/19/2022      FINDINGS:  2D and tomosynthesis images were reviewed at 1 mm slice thickness.      Density:  The breast tissue is extremely dense, which may limit the  sensitivity of mammography.      Postsurgical changes are noted on the right. No suspicious masses or  calcifications are identified.      Impression: No mammographic evidence of malignancy.      BI-RADS CATEGORY:  BI-RADS Category:  2 Benign.  Recommendation:  Routine Screening Mammogram in 1 Year.  Recommended Date:  1 Year.  Laterality:  Bilateral.      For any future breast imaging appointments, please call [phone]).          MACRO:  None      Signed by: Sanket Delgado 12/4/2023 1:13 PM  Dictation workstation:   [id number]      Assessment/Plan   Diagnoses and all orders for this visit:  Primary insomnia  -     traZODone (Desyrel) 50 mg tablet; Take 1 tablet (50 mg) by mouth as needed at bedtime for sleep.        Dear Pallavi Wallace     It was my pleasure to take care of you today in the office. Below are the things we discussed today:    1. Chronic cough: Use Albuterol as needed.    2. Insomnia: Start trazodone. Recommended that the patient take over-the-counter Melatonin when she wakes up during the night and is unable to go back to sleep.     Your yearly Physical is due in: Oct 2024   When you call the office for your yearly Physical, please ask them to inform me to order your blood work, so that you can get the fasting blood work before your appointment and we can discuss the results at your physical.      Please call me if any questions arise from now until your next visit. I will call you after I am done seeing patients. A Doctor is always available by phone when the office is closed. Please feel free to call for help with any problem that you feel shouldn't wait  until the office re-opens.     Scribe Attestation  By signing my name below, I, Cassandra Barreto   attest that this documentation has been prepared under the direction and in the presence of Rachel Reed MD.

## 2024-01-16 ENCOUNTER — OFFICE VISIT (OUTPATIENT)
Dept: PULMONOLOGY | Facility: HOSPITAL | Age: 64
End: 2024-01-16
Payer: COMMERCIAL

## 2024-01-16 VITALS
BODY MASS INDEX: 21.4 KG/M2 | SYSTOLIC BLOOD PRESSURE: 129 MMHG | TEMPERATURE: 97.3 F | WEIGHT: 117 LBS | HEART RATE: 90 BPM | OXYGEN SATURATION: 99 % | DIASTOLIC BLOOD PRESSURE: 78 MMHG

## 2024-01-16 DIAGNOSIS — R91.1 LUNG NODULE SEEN ON IMAGING STUDY: ICD-10-CM

## 2024-01-16 PROCEDURE — 1036F TOBACCO NON-USER: CPT | Performed by: INTERNAL MEDICINE

## 2024-01-16 PROCEDURE — 99204 OFFICE O/P NEW MOD 45 MIN: CPT | Performed by: INTERNAL MEDICINE

## 2024-01-16 PROCEDURE — 99214 OFFICE O/P EST MOD 30 MIN: CPT | Performed by: INTERNAL MEDICINE

## 2024-01-16 RX ORDER — FLUTICASONE PROPIONATE 50 MCG
2 SPRAY, SUSPENSION (ML) NASAL DAILY
Qty: 15.8 ML | Refills: 3 | Status: SHIPPED | OUTPATIENT
Start: 2024-01-16

## 2024-01-16 SDOH — ECONOMIC STABILITY: FOOD INSECURITY: WITHIN THE PAST 12 MONTHS, YOU WORRIED THAT YOUR FOOD WOULD RUN OUT BEFORE YOU GOT MONEY TO BUY MORE.: NEVER TRUE

## 2024-01-16 SDOH — ECONOMIC STABILITY: FOOD INSECURITY: WITHIN THE PAST 12 MONTHS, THE FOOD YOU BOUGHT JUST DIDN'T LAST AND YOU DIDN'T HAVE MONEY TO GET MORE.: NEVER TRUE

## 2024-01-16 ASSESSMENT — PAIN SCALES - GENERAL: PAINLEVEL: 0-NO PAIN

## 2024-01-16 ASSESSMENT — PATIENT HEALTH QUESTIONNAIRE - PHQ9
1. LITTLE INTEREST OR PLEASURE IN DOING THINGS: NOT AT ALL
SUM OF ALL RESPONSES TO PHQ9 QUESTIONS 1 & 2: 0
2. FEELING DOWN, DEPRESSED OR HOPELESS: NOT AT ALL

## 2024-01-16 ASSESSMENT — LIFESTYLE VARIABLES: HOW OFTEN DO YOU HAVE A DRINK CONTAINING ALCOHOL: MONTHLY OR LESS

## 2024-01-16 NOTE — PROGRESS NOTES
Division of Pulmonary, Critical Care, and Sleep Medicine    Reason for the consultation     Pallavi Wallace is a 63 y.o. female who presents to a [facility] for an evaluation for cough incidentally found left lower lobe nodule.  I have independently interviewed and examined the patient in the office and reviewed available records.    Physician HPI (1/16/2024):    Pallavi Wallace is a non-smoker.  She had had a cough for a few months that she has been following up with her PCP.  She has tried Tessalon and Hycodan without any help.  Her cough is finally better.  Her cough is mainly dry.  It it is not associated with chest tightness or wheezing or heartburn.  She does feel a tickle and a scratch in the back of her throat.  She denies any fever chills night sweats weight loss or loss of appetite.    She is currently not working but she plays tennis and she denies any shortness of breath.  Denies problems swallowing or chocking on food or cough when eating, or reflux. Denies tight skin, Raynaud's, joint pain or swelling. No rashes or ulcers. No dry eye or dry mouth. No orthopnea or PND or LE edema  Dyspnea Scale:    MMRC 0 - I only get breathless with strenuous exercise.      PMH  Past Medical History:   Diagnosis Date    Breast cancer (CMS/[facility])     Encounter for other preprocedural examination     Preoperative testing    Pain in right knee 06/27/2016    Acute pain of right knee    Personal history of irradiation     Personal history of other diseases of the female genital tract 11/13/2019    History of abnormal cervical Papanicolaou smear    Personal history of other specified conditions 08/17/2017    History of chronic cough     Stage I right breast cancer status post lumpectomy in 2018 radiation therapy.  She is currently on hormonal therapy.  Lumpectomy and 2018  No childhood asthma  No seasonal allergies. childhood allergies to bees that she outgrew as an adult    Previous pulmonary  history:  no history of recurrent infections, or lung disease as a child.  No previous lung hx, never on oxygen or inhaler therapy. Currently on no supplemental oxygen.  Has never been to pulmonary rehab.     Hospitalization History: Has not been hospitalized over the last year for breathing related problem.    Immunization History:  Immunization History   Administered Date(s) Administered    Flu vaccine (IIV4), preservative free *Check age/dose* 10/31/2018, 10/03/2022, 10/02/2023    Hep A, Unspecified 07/19/2016    Influenza, seasonal, injectable 10/20/2021    Pfizer COVID-19 vaccine, bivalent, age 12 years and older (30 mcg/0.3 mL) 11/27/2022    Pfizer Purple Cap SARS-CoV-2 02/25/2021, 03/18/2021    Poliovirus vaccine, subcutaneous (IPOL) 07/19/2016    Tdap vaccine, age 7 year and older (BOOSTRIX) 07/19/2016    Typhoid, oral 07/19/2016    Zoster vaccine, recombinant, adult (SHINGRIX) 05/20/2022, 08/22/2022       Family History:  Family History   Problem Relation Name Age of Onset    Atrial fibrillation Mother      Coronary artery disease Mother      Other (cardiac disorder) Mother      Other (htn) Mother      Osteoporosis Mother      Acute lymphoblastic leukemia Father  50    Other (lymphatic leukemia) Father      Other (cardiac disorder) Other      Other (htn) Other         Social History:  Tobacco, social when she was in college never since  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   She uses CBD Gummies for sleep  Has one dog   Current Medications:  Current Outpatient Medications   Medication Instructions    anastrozole (Arimidex) 1 mg tablet oral    calcium carbonate (Oscal) 500 mg calcium (1,250 mg) tablet 1 tablet, oral, 2 times daily with meals    cholecalciferol (Vitamin D-3) 50 mcg (2,000 unit) capsule oral    estradiol (Estrace) 0.01 % (0.1 mg/gram) vaginal cream Insert pea sized amount into vagina twice weekly    magnesium 30 mg, oral, 2 times daily    MELATONIN  ORAL oral    rosuvastatin (CRESTOR) 5 mg, oral, 3 times weekly    traZODone (DESYREL) 50 mg, oral, Nightly PRN    tretinoin (Retin-A) 0.05 % cream Topical, Nightly, A pea-sized amount to cover the whole face; start every 2-3 nights and gradually increase to nightly        Drug Allergies/Intolerances:  No Known Allergies     Review of Systems:  All systems have been reviewed and are negative for findings pertinent to the chief complaint except as detailed below or described in the HPI.  Constitutional: Denies symptoms related to weight loss, fever or chills.  ENT: Denies symptoms related to hearing loss, sore throat, sinusitis and masses.  Cardiovascular: Denies symptoms related to chest pain, palpitations, edema and orthopnea.  Respiratory: Denies symptoms related to dyspnea, wheezing, cough, hemoptysis or increased sputum.  Gastrointestinal: Denies symptoms related to nausea, vomiting, diarrhea or constipation. GERD   Genitourinary: Denies symptoms related to hematuria, nocturia, frequency or urgency.  Musculoskeletal: Denies symptoms related to arthritis, joint pain, leg pain, weakness or joint stiffness. Raynaud's  Integumentary: Denies symptoms related to skin rashes, moles, pigment changes or ulcers.  Neurological: Denies symptoms related to dizziness, syncope, seizures, tremors or paralysis.  Psychiatric: Denies psychiatric symptoms associated with PTSD, depression, anxiety, psychosis or hallucinations.  Endocrine: Denies endocrine symptoms related to diabetes, polyuria, and cold/heat intolerance.  Hematologic: Denies hematologic symptoms associated with anemia, bruising, bleeding or lymph node tenderness.  Allergic: Denies allergic symptoms associated with allergy to meds, foods or contrast dye.  All other review of systems are negative and/or non-contributory.    Physical Examination:  /78 (BP Location: Left arm, Patient Position: Sitting)   Pulse 90   Temp 36.3 °C (97.3 °F)   Wt 53.1 kg (117 lb)    SpO2 99% Comment: RA  BMI 21.40 kg/m²       General: ambulated independently; no acute distress; well-nourished; work of breathing was not increased; normal vocal character  HEENT: normocephalic; anicteric sclerae; conjunctivae not injected; nasal mucosa was unremarkable; oropharynx was clear without evidence of thrush; dentition was good.  Neck: supple; no lymphadenopathy or thyromegaly.  Chest: clear to auscultation bilaterally; no chest wall deformity.  Cardiac: regular rhythm; no gallop or murmur.  Abdomen: soft; non-tender; non-distended; no hepatosplenomegaly.  Extremities: no leg edema; no digital clubbing; 2+ pulses  Psychiatric: did not appear depressed or anxious.      Diagnostic testing       -PFTs none      -Imaging: I have personally visualized the most recent imaging and I agree with the radiologist interpretation   A chest CT from October 2023 was reviewed by me and showed a left lower lobe nodule 9mm with a hint of fatty attenuation in the center.        Assessment and Recommendations:    Ms. Wallace is a 63 y.o. female non-smoker with no risk factors, she has a history of stage I breast cancer in 2018 status postlumpectomy and radiation.  She was found to incidentally have a left lower lobe nodule.  It appears benign with a hint of fat attenuation.  I will obtain a repeat chest CT now.  I will also review the CAT scan with radiology.  The patient was instructed to call the office for her results.  Regarding her cough it appears to be consistent with upper airway cough syndrome and I gave her a prescription for Flonase and will reassess.

## 2024-01-16 NOTE — PATIENT INSTRUCTIONS
Dear Pallavi Wallace It was nice seeing you today. We discussed the following:     Your cough is finally getting better and could be due to allergies and I will keep you Flonase nasal spray to use daily  The nodule on the left lung is most likely 9 but will need follow-up.  Please call the office for results after you complete your chest CT.    For scheduling purposes:    Call [phone] to schedule Radiology tests such as Nuclear Medicine Stress Tests, CT Scans, and MRI's.    Should you have any questions Please Call my assistant Abilio Krishnamurthy at [phone] or our pulmonary nurse Felicita Johnston [phone].

## 2024-01-22 ENCOUNTER — APPOINTMENT (OUTPATIENT)
Dept: PULMONOLOGY | Facility: HOSPITAL | Age: 64
End: 2024-01-22
Payer: COMMERCIAL

## 2024-02-05 ENCOUNTER — APPOINTMENT (OUTPATIENT)
Dept: RADIOLOGY | Facility: CLINIC | Age: 64
End: 2024-02-05
Payer: COMMERCIAL

## 2024-02-07 ENCOUNTER — HOSPITAL ENCOUNTER (OUTPATIENT)
Dept: RADIOLOGY | Facility: CLINIC | Age: 64
End: 2024-02-07
Payer: COMMERCIAL

## 2024-02-08 ENCOUNTER — LAB (OUTPATIENT)
Dept: LAB | Facility: LAB | Age: 64
End: 2024-02-08
Payer: COMMERCIAL

## 2024-02-08 ENCOUNTER — HOSPITAL ENCOUNTER (OUTPATIENT)
Dept: RADIOLOGY | Facility: CLINIC | Age: 64
Discharge: HOME | End: 2024-02-08
Payer: COMMERCIAL

## 2024-02-08 DIAGNOSIS — E78.2 MODERATE MIXED HYPERLIPIDEMIA NOT REQUIRING STATIN THERAPY: ICD-10-CM

## 2024-02-08 DIAGNOSIS — R91.1 LUNG NODULE SEEN ON IMAGING STUDY: ICD-10-CM

## 2024-02-08 PROCEDURE — 36415 COLL VENOUS BLD VENIPUNCTURE: CPT

## 2024-02-08 PROCEDURE — 82172 ASSAY OF APOLIPOPROTEIN: CPT

## 2024-02-08 PROCEDURE — 71250 CT THORAX DX C-: CPT | Performed by: RADIOLOGY

## 2024-02-08 PROCEDURE — 71250 CT THORAX DX C-: CPT

## 2024-02-11 LAB — LPA SERPL-MCNC: 22 MG/DL

## 2024-02-15 ENCOUNTER — APPOINTMENT (OUTPATIENT)
Dept: HEMATOLOGY/ONCOLOGY | Facility: CLINIC | Age: 64
End: 2024-02-15
Payer: COMMERCIAL

## 2024-03-19 ENCOUNTER — PATIENT MESSAGE (OUTPATIENT)
Dept: PRIMARY CARE | Facility: CLINIC | Age: 64
End: 2024-03-19
Payer: COMMERCIAL

## 2024-03-19 DIAGNOSIS — F51.01 PRIMARY INSOMNIA: ICD-10-CM

## 2024-03-20 RX ORDER — TRAZODONE HYDROCHLORIDE 50 MG/1
75 TABLET ORAL NIGHTLY PRN
Qty: 135 TABLET | Refills: 2 | Status: SHIPPED | OUTPATIENT
Start: 2024-03-20

## 2024-05-20 ENCOUNTER — APPOINTMENT (OUTPATIENT)
Dept: PULMONOLOGY | Facility: CLINIC | Age: 64
End: 2024-05-20
Payer: COMMERCIAL

## 2024-07-10 ENCOUNTER — OFFICE VISIT (OUTPATIENT)
Dept: PULMONOLOGY | Facility: CLINIC | Age: 64
End: 2024-07-10
Payer: COMMERCIAL

## 2024-07-10 VITALS
TEMPERATURE: 97.3 F | RESPIRATION RATE: 16 BRPM | BODY MASS INDEX: 20.92 KG/M2 | DIASTOLIC BLOOD PRESSURE: 75 MMHG | WEIGHT: 114.4 LBS | HEART RATE: 67 BPM | SYSTOLIC BLOOD PRESSURE: 110 MMHG | OXYGEN SATURATION: 98 %

## 2024-07-10 DIAGNOSIS — R91.1 LUNG NODULE SEEN ON IMAGING STUDY: Primary | ICD-10-CM

## 2024-07-10 PROCEDURE — 99214 OFFICE O/P EST MOD 30 MIN: CPT | Performed by: INTERNAL MEDICINE

## 2024-07-10 PROCEDURE — 99204 OFFICE O/P NEW MOD 45 MIN: CPT | Performed by: INTERNAL MEDICINE

## 2024-07-10 PROCEDURE — 1036F TOBACCO NON-USER: CPT | Performed by: INTERNAL MEDICINE

## 2024-07-10 ASSESSMENT — ENCOUNTER SYMPTOMS
TREMORS: 0
FREQUENCY: 0
BRUISES/BLEEDS EASILY: 0
STRIDOR: 0
RHINORRHEA: 0
SPEECH DIFFICULTY: 0
LIGHT-HEADEDNESS: 0
EYE DISCHARGE: 0
HEADACHES: 0
PALPITATIONS: 0
NERVOUS/ANXIOUS: 0
WHEEZING: 0
COUGH: 0
ABDOMINAL DISTENTION: 0
EYE REDNESS: 0
SHORTNESS OF BREATH: 0
AGITATION: 0
CONSTIPATION: 0
ADENOPATHY: 0
SLEEP DISTURBANCE: 0
ABDOMINAL PAIN: 0
NAUSEA: 0
FEVER: 0
DIZZINESS: 0
SINUS PRESSURE: 0
HEMATURIA: 0
NUMBNESS: 0
DIFFICULTY URINATING: 0
SINUS PAIN: 0
CHOKING: 0
DYSURIA: 0
APNEA: 0
ARTHRALGIAS: 0
JOINT SWELLING: 0
FACIAL SWELLING: 0
FATIGUE: 0
UNEXPECTED WEIGHT CHANGE: 0
WEAKNESS: 0

## 2024-07-10 NOTE — PROGRESS NOTES
@PULMONARY CONSULTATION@         Reason for Consult:  lung nodule    ASSESSMENT:   The patient is a 63-year-old with pulmonary nodularity in the left lower lobe which is probably old granulomatous in nature but needs continued follow-up to assure stability.  She had some sinus congestion with probable postviral coughing which  seems to have dissipated at the present time.  If it reoccurs further evaluation with complete pulmonary function test, FeNO level etc. should be performed.  She does have a family history of asthma in her sister.  Currently her lungs are clear and her vital signs are stable.    PLAN:   Would obtain a follow-up CT scan in 3 months.  This can be scheduled by calling     If your coughing returns please let me know and further treatment and diagnostics will be ordered.      HISTORY OF PRESENT ILLNESS:           The patient is a 63-year-old who has been followed for pulmonary nodularity having been seen by Dr. Amaya on January 16, 2024.  She is a non-smoker and she also been reporting some coughing.  She only reports strenuous activity because of shortness of breath.  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    The cardiac score from October 26, 2023 revealed the following  1. Coronary artery calcium score of 5*.  2. Incompletely visualized pulmonary nodule laterally in the left  lower lobe measures at least 9 mm. Further evaluation with CT scan of  the chest is recommended    The CT scan from November 3, 2023 revealed the following  Redemonstrated solitary left lower lobe solid nodule, 9 mm. Consider  follow-up CT, biopsy or PET-CT in 3 months based on size of nodule.      No suspicious lymphadenopathy.    The CT scan from February 8, 2024 revealed the following  1. Stable 8 mm left lung base nodule.  Consider follow up non  contrast chest CT at 6-12 months, then consider CT chest at 18-24  months this study was compared to November 2, 2023.       The patient had a routine CT calcification score as outlined above in the left lower lobe nodule was outlined.  She has been followed serially with the last CT scan from February 8, 2024 revealing no change in the nodularity.  She apparently had some cough and congestion last October which lasted for 3 months or so.  She was given some Flonase for a postnasal drip.  She did have some recurrence of some coughing several months ago but it has dissipated.  She does have esophageal reflux.  She has no eczema.  She has a family history of asthma in her sister.  She has not lived in the [facility] etc.  She did go to school at [employer].  She has done hiking in [place] recently  and has not had any exposure to farms etc.  She has a pharmacist by education.  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 Shake gently. Before first use, prime pump. After use, clean tip and replace cap., Disp: 15.8 mL, Rfl: 3    magnesium 30 mg tablet, Take 1 tablet (30 mg) by mouth 2 times a day., Disp: , Rfl:     MELATONIN ORAL, Take by mouth., Disp: , Rfl:     rosuvastatin (Crestor) 5 mg tablet, Take 1 tablet (5 mg) by mouth 3 (three) times a week. (Patient taking differently: Take 1 tablet (5 mg) by mouth 3 (three) times a week. Mon wed fri), Disp: 39 tablet, Rfl: 3    traZODone (Desyrel) 50 mg tablet, Take 1.5 tablets (75 mg) by mouth as needed at bedtime for sleep., Disp: 135 tablet, Rfl: 2    tretinoin (Retin-A) 0.05 % cream, Apply  topically once daily at bedtime. A pea-sized amount to cover the whole face; start every 2-3 nights and gradually increase to nightly, Disp: 45 g, Rfl: 3     FAMILY HISTORY:   Sister with asthma  SOCIAL HISTORY:  Minimal smoking history during college  EXPOSURE AND WORK HISTORY:  Formally trained as a pharmacist, teaches tennis, remains quite active physically    Review of Systems   Constitutional:  Negative for fatigue, fever and unexpected weight change.   HENT:  Negative for congestion, facial swelling, nosebleeds, postnasal drip, rhinorrhea, sinus pressure and sinus pain.    Eyes:  Negative for discharge, redness and visual disturbance.   Respiratory:  Negative for apnea, cough, choking, shortness of breath, wheezing and stridor.    Cardiovascular:  Negative for chest pain, palpitations and leg swelling.   Gastrointestinal:  Negative for abdominal distention, abdominal pain, constipation and nausea.   Endocrine: Negative for cold intolerance and heat intolerance.   Genitourinary:  Negative for difficulty urinating, dysuria, frequency and hematuria.   Musculoskeletal:  Negative for arthralgias, gait problem and joint swelling.   Allergic/Immunologic: Negative for environmental allergies, food allergies and immunocompromised state.   Neurological:  Negative for dizziness, tremors, syncope, speech difficulty, weakness, light-headedness, numbness and headaches.   Hematological:  Negative for adenopathy. Does not bruise/bleed easily.   Psychiatric/Behavioral:  Negative for agitation, behavioral problems and sleep disturbance. The patient is not nervous/anxious.         Vitals:    07/10/24 0812   BP: 110/75   Pulse: 67   Resp: 16   Temp: 36.3 °C (97.3 °F)   SpO2: 98%        Physical Exam  Vitals reviewed.   Constitutional:       Appearance: Normal appearance.   HENT:      Head: Normocephalic and atraumatic.   Eyes:      Extraocular Movements: Extraocular movements intact.   Cardiovascular:      Rate and Rhythm: Normal  rate and regular rhythm.      Heart sounds: No murmur heard.     No friction rub. No gallop.   Pulmonary:      Effort: Pulmonary effort is normal. No respiratory distress.      Breath sounds: Normal breath sounds. No stridor. No wheezing, rhonchi or rales.   Chest:      Chest wall: No tenderness.   Abdominal:      General: Abdomen is flat. There is no distension.      Palpations: Abdomen is soft. There is no mass.      Tenderness: There is no abdominal tenderness.   Musculoskeletal:         General: Normal range of motion.      Cervical back: Normal range of motion.      Right lower leg: No edema.      Left lower leg: No edema.   Skin:     General: Skin is warm and dry.   Neurological:      Mental Status: She is alert and oriented to person, place, and time.   Psychiatric:         Mood and Affect: Mood normal.         Behavior: Behavior normal.

## 2024-07-10 NOTE — LETTER
July 10, 2024     Rachel Reed MD  [address]  [address]  [facility] [address]    Patient: Pallavi Wallace   YOB: 1960   Date of Visit: 7/10/2024       Dear Dr. Rachel Reed MD:    Thank you for referring Pallavi Wallace to me for evaluation. Below are my notes for this consultation.  If you have questions, please do not hesitate to call me. I look forward to following your patient along with you.       Sincerely,     Manuel Murphy MD MPH      CC: No Recipients  ______________________________________________________________________________________    @PULMONARY CONSULTATION@         Reason for Consult:  lung nodule    ASSESSMENT:   The patient is a 63-year-old with pulmonary nodularity in the left lower lobe which is probably old granulomatous in nature but needs continued follow-up to assure stability.  She had some sinus congestion with probable postviral coughing which  seems to have dissipated at the present time.  If it reoccurs further evaluation with complete pulmonary function test, FeNO level etc. should be performed.  She does have a family history of asthma in her sister.  Currently her lungs are clear and her vital signs are stable.    PLAN:   Would obtain a follow-up CT scan in 3 months.  This can be scheduled by calling     If your coughing returns please let me know and further treatment and diagnostics will be ordered.      HISTORY OF PRESENT ILLNESS:           The patient is a 63-year-old who has been followed for pulmonary nodularity having been seen by Dr. Amaya on January 16, 2024.  She is a non-smoker and she also been reporting some coughing.  She only reports strenuous activity because of shortness of breath.  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    The cardiac score from October 26, 2023 revealed the following  1. Coronary artery calcium score of 5*.  2. Incompletely visualized pulmonary nodule laterally in  the left  lower lobe measures at least 9 mm. Further evaluation with CT scan of  the chest is recommended    The CT scan from November 3, 2023 revealed the following  Redemonstrated solitary left lower lobe solid nodule, 9 mm. Consider  follow-up CT, biopsy or PET-CT in 3 months based on size of nodule.      No suspicious lymphadenopathy.    The CT scan from February 8, 2024 revealed the following  1. Stable 8 mm left lung base nodule.  Consider follow up non  contrast chest CT at 6-12 months, then consider CT chest at 18-24  months this study was compared to November 2, 2023.      The patient had a routine CT calcification score as outlined above in the left lower lobe nodule was outlined.  She has been followed serially with the last CT scan from February 8, 2024 revealing no change in the nodularity.  She apparently had some cough and congestion last October which lasted for 3 months or so.  She was given some Flonase for a postnasal drip.  She did have some recurrence of some coughing several months ago but it has dissipated.  She does have esophageal reflux.  She has no eczema.  She has a family history of asthma in her sister.  She has not lived in the [facility] etc.  She did go to school at [employer].  She has done hiking in [place] recently  and has not had any exposure to farms etc.  She has a pharmacist by education.  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 Shake gently. Before first use, prime pump.  After use, clean tip and replace cap., Disp: 15.8 mL, Rfl: 3  •  magnesium 30 mg tablet, Take 1 tablet (30 mg) by mouth 2 times a day., Disp: , Rfl:   •  MELATONIN ORAL, Take by mouth., Disp: , Rfl:   •  rosuvastatin (Crestor) 5 mg tablet, Take 1 tablet (5 mg) by mouth 3 (three) times a week. (Patient taking differently: Take 1 tablet (5 mg) by mouth 3 (three) times a week. Mon wed fri), Disp: 39 tablet, Rfl: 3  •  traZODone (Desyrel) 50 mg tablet, Take 1.5 tablets (75 mg) by mouth as needed at bedtime for sleep., Disp: 135 tablet, Rfl: 2  •  tretinoin (Retin-A) 0.05 % cream, Apply topically once daily at bedtime. A pea-sized amount to cover the whole face; start every 2-3 nights and gradually increase to nightly, Disp: 45 g, Rfl: 3     FAMILY HISTORY:   Sister with asthma  SOCIAL HISTORY:  Minimal smoking history during college  EXPOSURE AND WORK HISTORY:  Formally trained as a pharmacist, teaches tennis, remains quite active physically    Review of Systems   Constitutional:  Negative for fatigue, fever and unexpected weight change.   HENT:  Negative for congestion, facial swelling, nosebleeds, postnasal drip, rhinorrhea, sinus pressure and sinus pain.    Eyes:  Negative for discharge, redness and visual disturbance.   Respiratory:  Negative for apnea, cough, choking, shortness of breath, wheezing and stridor.    Cardiovascular:  Negative for chest pain, palpitations and leg swelling.   Gastrointestinal:  Negative for abdominal distention, abdominal pain, constipation and nausea.   Endocrine: Negative for cold intolerance and heat intolerance.   Genitourinary:  Negative for difficulty urinating, dysuria, frequency and hematuria.   Musculoskeletal:  Negative for arthralgias, gait problem and joint swelling.   Allergic/Immunologic: Negative for environmental allergies, food allergies and immunocompromised state.   Neurological:  Negative for dizziness, tremors, syncope, speech difficulty, weakness, light-headedness,  numbness and headaches.   Hematological:  Negative for adenopathy. Does not bruise/bleed easily.   Psychiatric/Behavioral:  Negative for agitation, behavioral problems and sleep disturbance. The patient is not nervous/anxious.         Vitals:    07/10/24 0812   BP: 110/75   Pulse: 67   Resp: 16   Temp: 36.3 °C (97.3 °F)   SpO2: 98%        Physical Exam  Vitals reviewed.   Constitutional:       Appearance: Normal appearance.   HENT:      Head: Normocephalic and atraumatic.   Eyes:      Extraocular Movements: Extraocular movements intact.   Cardiovascular:      Rate and Rhythm: Normal rate and regular rhythm.      Heart sounds: No murmur heard.     No friction rub. No gallop.   Pulmonary:      Effort: Pulmonary effort is normal. No respiratory distress.      Breath sounds: Normal breath sounds. No stridor. No wheezing, rhonchi or rales.   Chest:      Chest wall: No tenderness.   Abdominal:      General: Abdomen is flat. There is no distension.      Palpations: Abdomen is soft. There is no mass.      Tenderness: There is no abdominal tenderness.   Musculoskeletal:         General: Normal range of motion.      Cervical back: Normal range of motion.      Right lower leg: No edema.      Left lower leg: No edema.   Skin:     General: Skin is warm and dry.   Neurological:      Mental Status: She is alert and oriented to person, place, and time.   Psychiatric:         Mood and Affect: Mood normal.         Behavior: Behavior normal.

## 2024-07-10 NOTE — PATIENT INSTRUCTIONS
Would obtain a follow-up CT scan in 3 months.  This can be scheduled by calling     If your coughing returns please let me know and further treatment and diagnostics will be ordered.

## 2024-07-25 ENCOUNTER — APPOINTMENT (OUTPATIENT)
Dept: PULMONOLOGY | Facility: HOSPITAL | Age: 64
End: 2024-07-25
Payer: COMMERCIAL

## 2024-08-01 ENCOUNTER — OFFICE VISIT (OUTPATIENT)
Dept: PRIMARY CARE | Facility: CLINIC | Age: 64
End: 2024-08-01
Payer: COMMERCIAL

## 2024-08-01 VITALS
OXYGEN SATURATION: 95 % | HEART RATE: 86 BPM | DIASTOLIC BLOOD PRESSURE: 75 MMHG | BODY MASS INDEX: 20.8 KG/M2 | HEIGHT: 62 IN | SYSTOLIC BLOOD PRESSURE: 118 MMHG | WEIGHT: 113 LBS

## 2024-08-01 DIAGNOSIS — R21 RASH: ICD-10-CM

## 2024-08-01 DIAGNOSIS — R21 RASH: Primary | ICD-10-CM

## 2024-08-01 PROCEDURE — 99213 OFFICE O/P EST LOW 20 MIN: CPT | Performed by: FAMILY MEDICINE

## 2024-08-01 PROCEDURE — 1036F TOBACCO NON-USER: CPT | Performed by: FAMILY MEDICINE

## 2024-08-01 PROCEDURE — 3008F BODY MASS INDEX DOCD: CPT | Performed by: FAMILY MEDICINE

## 2024-08-01 RX ORDER — PREDNISONE 20 MG/1
20 TABLET ORAL 2 TIMES DAILY
Qty: 6 TABLET | Refills: 0 | Status: SHIPPED | OUTPATIENT
Start: 2024-08-01 | End: 2024-08-04

## 2024-08-01 RX ORDER — PREDNISONE 20 MG/1
20 TABLET ORAL 2 TIMES DAILY
Qty: 6 TABLET | Refills: 0 | Status: SHIPPED
Start: 2024-08-01 | End: 2024-08-01 | Stop reason: SDUPTHER

## 2024-09-17 RX ORDER — ANASTROZOLE 1 MG/1
TABLET ORAL
OUTPATIENT
Start: 2024-09-17

## 2024-09-18 ENCOUNTER — TELEPHONE (OUTPATIENT)
Dept: HEMATOLOGY/ONCOLOGY | Facility: CLINIC | Age: 64
End: 2024-09-18
Payer: COMMERCIAL

## 2024-09-18 DIAGNOSIS — C50.911 MALIGNANT NEOPLASM OF RIGHT FEMALE BREAST, UNSPECIFIED ESTROGEN RECEPTOR STATUS, UNSPECIFIED SITE OF BREAST: ICD-10-CM

## 2024-09-18 NOTE — TELEPHONE ENCOUNTER
Nurse called patient to help with getting patient scheduled to see Kamryn Martínez CNP due to follow up and needing refill on anastrozole. Talked with patient on the phone about how to get to center, date on time of appt 9-20 at 8:30 am and to call us with any additional questions.    FAITH COLVIN RN

## 2024-09-19 NOTE — PROGRESS NOTES
Oncology Follow-Up    Pallavi Wallace  [id number]        Cancer Staging   Malignant neoplasm of right female breast, unspecified estrogen receptor status, unspecified site of breast (Multi)  Staging form: Breast, AJCC 8th Edition  - Clinical: No stage assigned - Unsigned      Treatment History:    1. Screening mammogram 2/13/18 negative but with breast density, screening u/s was done that showed mass LIQ right breast.   2. Core biopsy 3/20/18 showed invasive ductal carcinoma grade 1, 0.5 cm ER 94% OR 63% HER2  negative 1+. Mass measured 4 x 5 x 6 mm. Clip was placed at the time of biopsy.   3. Breast MRI 4/30/18 showed only the index mass right breast  4. Right partial mastectomy and SLN 5/4/18 showed invasive ductal carcinoma with tubular features, grade 1, 0/3 SLN, tumor measured 0.8 cm.  DCIS  was absent. All margins were negative.   5. Right breast radiation from 6/18/18-7/6/18 for 20 treatments  6. Anastrozole started 9/27/18  7. Bone density on 8/21/18 - normal bone density   8. BCI testing 3/6/2023 showed a 7.8% cumulative risk of distant recurrence,  a 4.3% risk of late distant recurrence, and a likely benefit with extended therapy. Pallavi preferred to continue with extended therapy to complete 10 years.       Subjective    Pallavi presents for her Oncology Follow Up Visit. She feels well though lost her mother 8 months ago and her nephew is battling metastatic pancreatic cancer. She states one happiness in the family is that one of her daughters became engaged. Pallavi continues on anastrozole. She is doing monthly Breast self exams, she sees dermatology, works out regularly, and maintains an ideal weight. Pallavi rates her energy level as 9-10/10 and reports a distress score of 5-6/10 due to the above. She denies any unusual headaches, balance issues, depression, cough, shortness of breath, problems swallowing, changes in chest/breast area, abdominal pain, bone or muscle pain, vaginal bleeding, rectal bleeding,  blood in the urine, vaginal dryness, swelling arms or legs, new or unusual skin moles or lesions.     Objective      Vitals:    09/20/24 0824   BP: 118/75   Pulse: 63   Temp: 35 °C (95 °F)        Constitutional: Well developed, alert/oriented x3, no distress, cooperative   Eyes: clear sclera   ENMT: mucous membranes moist, no apparent lesions   Head/Neck: Neck supple, no bruits   Respiratory/Thorax: Patent airways, normal breath sounds with good chest expansion   Cardiovascular: Regular rate and rhythm, no murmurs, 2+ equal pulses of the extremities,   Gastrointestinal: Nondistended, soft, non-tender, no masses palpable, no organomegaly   Musculoskeletal: ROM intact, no joint swelling, normal strength   Extremities: normal extremities, no edema, cyanosis, contusions or wounds   Neurological: alert and oriented x3,  normal strength   Breast:  Right breast + for breast conserving surgery with well healed UOQ and right axillary incisions; no masses, nodules, skin changes, discharge. Left breast without masses, nodules, skin changes, discharge.     Lymphatic: No significant lymphadenopathy   Psychological: Appropriate mood and behavior   Skin: Warm and dry, no lesions, no rashes      Physical Exam     Lab Results   Component Value Date    WBC 5.6 09/27/2023    HGB 13.5 09/27/2023    HCT 43.3 09/27/2023    MCV 95 09/27/2023     09/27/2023       Chemistry    Lab Results   Component Value Date/Time     09/27/2023 0900    K 4.9 09/27/2023 0900     09/27/2023 0900    CO2 27 09/27/2023 0900    BUN 15 09/27/2023 0900    CREATININE 0.76 09/27/2023 0900    Lab Results   Component Value Date/Time    CALCIUM 10.3 09/27/2023 0900    ALKPHOS 84 09/27/2023 0900    AST 20 09/27/2023 0900    ALT 14 09/27/2023 0900    BILITOT 0.5 09/27/2023 0900         Imaging:    Status Exam Begun Exam Ended   Final 11/30/2023 07:35 11/30/2023 07:45     Study Result    Narrative & Impression   Interpreted By:  Sanket Delgado,    STUDY:  BI MAMMO BILATERAL SCREENING TOMOSYNTHESIS; 11/30/2023 7:45 am      ACCESSION NUMBER(S):  [id number]      ORDERING CLINICIAN:  DENEEN VILLEGAS      INDICATION:  Screening. History of right lumpectomy with radiation.      COMPARISON:  09/01/2021, 09/19/2022      FINDINGS:  2D and tomosynthesis images were reviewed at 1 mm slice thickness.      Density:  The breast tissue is extremely dense, which may limit the  sensitivity of mammography.      Postsurgical changes are noted on the right. No suspicious masses or  calcifications are identified.      IMPRESSION:  No mammographic evidence of malignancy.      BI-RADS CATEGORY:  BI-RADS Category:  2 Benign.  Recommendation:  Routine Screening Mammogram in 1 Year.  Recommended Date:  1 Year.  Laterality:  Bilateral.         Status Exam Begun Exam Ended   Final  6/30/2023 08:26     Study Result    Narrative   Interpreted By:  BLANCHE DOMINGUEZ MD and TIFFANIE WEST MD  MRN: [id number]  Patient Name: ADDY GOODRICH     STUDY:  MRI BREAST BILATERAL WITH CONTRAST FAST SCREENING (SELF PAY);  6/30/2023 8:26 am     ACCESSION NUMBER(S):  [id number]     ORDERING CLINICIAN:  NISREEN HARVEY     INDICATION:  Dense breast tissue on mammography. History of right breast cancer  status post lumpectomy with radiation in 2018.     COMPARISON:  Breast MRI 11/01/2021. Mammogram dated 09/19/2022.     TECHNIQUE:  Using a dedicated breast coil, STIR axial and T1-weighted fat  saturation axial images of the breasts were obtained, the latter both  before and after intravenous administration of Gadolinium DTPA.     Intravenous contrast:  11 milliliter of DOTAREM GADOTERATE MEGLUMINE  INJECTION     FINDINGS:  There is  symmetric  minimal bilateral background enhancement.     There is heterogenous fibroglandular tissue.     RIGHT BREAST:  No suspicious mass or nonmass enhancement is  identified.     No axillary or internal mammary lymphadenopathy is appreciated.     LEFT BREAST:  No suspicious  mass or nonmass enhancement is identified.     No axillary or internal mammary lymphadenopathy is appreciated.     NON-BREAST FINDINGS:  None.      Impression   No MRI evidence of malignancy in either breast.     BI-RADS CATEGORY:     Category: 1 - Negative.  Recommendation:  1 Year Screening.     Status Exam Begun Exam Ended   Final  8/25/2023 14:16     Study Result    Narrative   Interpreted By:  BLUEJAZMINE DAVISDO  MRN: [id number]  Patient Name: PALLAVI GOODRICH     STUDY:  BONE DENSITY, DEXA 1 OR MORE SITES: AXIAL SKELETN8/25/2023 2:16 pm     INDICATION:  hx breast cancer, on aromatase inhibitor, osteopenia  M85.80:  Osteopenia C50.311: Malignant neoplasm of lower-inner quadrant of  right breast of female, estrogen receptor positive Z51.81: Encounter  for monitoring aromatase inhibitor therapy. The patient is a 61 y/o  year old F.     COMPARISON:  None.     ACCESSION NUMBER(S):  [id number]     ORDERING CLINICIAN:  NISREEN HARVEY     TECHNIQUE:  BONE DENSITY, DEXA 1 OR MORE SITES: AXIAL SKELETN     FINDINGS:  SPINE L1-L4  Bone Mineral Density: 0.903  T-Score -1.3  Z-Score 0.2  Classification:  Osteopenia  Bone Mineral Density change vs baseline:  Not reported  Bone Mineral Density change vs previous: Not reported     LEFT FEMUR -TOTAL  Bone Mineral Density: 0.777  T-Score -1.4   Z-Score  -0.3  Classification:  Osteopenia  Bone Mineral Density change vs baseline: Not reported  Bone Mineral Density change vs previous: Not reported     LEFT FEMUR -NECK  Bone Mineral Density: 0.715  T-Score -1.2  Z-Score 0.2  Classification:  Osteopenia        Assessment/Plan  Pallavi is a 64 yo woman with a hx of T1bN0 right IDC diagnosed March 2018. She is s/p right partial mastectomy, XRT, and is currently on an extended course of anastrozole as Breast Cancer Index testing showed benefit with extended therapy, though a low risk of recurrence. There is no evidence of recurrent disease on today's exam.     Plan:  Exam is negative.  Discussed  Breast Cancer Index testing, Pallavi wishes to continue with therapy to complete 10 years.  Continue anastrozole 1mg daily.  See Dr. Stinson with mammogram in early December.  Encouraged monthly breast self exams, plant based diet, keep alcohol <3 drinks/week, exercise at least 2.5 hours/week.  We reviewed signs/symptoms of recurrence including new masses, new pigmented lesion, tugging or pulling of the skin, nipple discharge, rash in or around the chest area, or any new finding that doesn't resolve within a 2-3 weeks.  All of Pallavi's questions/concerns were addressed.  Over 25 minutes of time was spent with this patient with >50% of the time with education, counseling, and coordination of care.  I will see Pallavi back in one year. She will call with any concerns      Diagnoses and all orders for this visit:  Malignant neoplasm of right female breast, unspecified estrogen receptor status, unspecified site of breast (Multi)  -     BI mammo bilateral screening tomosynthesis; Future  -     BI mammo bilateral screening tomosynthesis; Future  -     Clinic Appointment Request Follow Up; NISREEN HARVEY; Janet Harvey, ASUNCION-CNP

## 2024-09-20 ENCOUNTER — OFFICE VISIT (OUTPATIENT)
Dept: HEMATOLOGY/ONCOLOGY | Facility: CLINIC | Age: 64
End: 2024-09-20
Payer: COMMERCIAL

## 2024-09-20 VITALS
BODY MASS INDEX: 19.92 KG/M2 | HEART RATE: 63 BPM | SYSTOLIC BLOOD PRESSURE: 118 MMHG | DIASTOLIC BLOOD PRESSURE: 75 MMHG | TEMPERATURE: 95 F | WEIGHT: 108.91 LBS

## 2024-09-20 DIAGNOSIS — C50.911 MALIGNANT NEOPLASM OF RIGHT FEMALE BREAST, UNSPECIFIED ESTROGEN RECEPTOR STATUS, UNSPECIFIED SITE OF BREAST: ICD-10-CM

## 2024-09-20 DIAGNOSIS — Z79.811 ENCOUNTER FOR MONITORING AROMATASE INHIBITOR THERAPY: Primary | ICD-10-CM

## 2024-09-20 DIAGNOSIS — Z51.81 ENCOUNTER FOR MONITORING AROMATASE INHIBITOR THERAPY: Primary | ICD-10-CM

## 2024-09-20 PROCEDURE — 99203 OFFICE O/P NEW LOW 30 MIN: CPT | Performed by: NURSE PRACTITIONER

## 2024-09-20 PROCEDURE — 99213 OFFICE O/P EST LOW 20 MIN: CPT | Performed by: NURSE PRACTITIONER

## 2024-09-20 ASSESSMENT — PATIENT HEALTH QUESTIONNAIRE - PHQ9
SUM OF ALL RESPONSES TO PHQ9 QUESTIONS 1 & 2: 0
2. FEELING DOWN, DEPRESSED OR HOPELESS: NOT AT ALL
1. LITTLE INTEREST OR PLEASURE IN DOING THINGS: NOT AT ALL

## 2024-09-20 NOTE — PATIENT INSTRUCTIONS
Please call us at [phone] option 5 then option 2 with any questions or concerns     Please schedule yearly follow up visit with Kamryn Martínez for DEC 2025    Please schedule Mammogram for this year and schedule next years rachell as well    1. Exercise 2.5 hours per week; bone strengthening, cardio-vascular, resistance training.  2. Please do self breast exams monthly.  3. Keep alcohol under 3 drinks per week.  4. Sun safety - limit sun exposure from 11a-2p when its at its hottest, apply 15-30 sun block and re-apply every 1-2 hours if perspiring or swimming.  5. Eat a plant based diet, add in oily fishes such as mackerel, tuna, and salmon.  6. Get in at least 1,000 mg of calcium per day through diet or supplement for bone strength. Examples of foods higher in calcium are milk, yogurt, fruited yogurt, oranges, fortified orange juice, almonds, almond milk, broccoli, spinach, bok emmett, mustard greens, puddings, custards, ice cream, fortified cereals, bars, and crackers.   7. Please continue on your anastrozole 1 mg daily. Refill sent today.   8. Please call the office if any new mass or rash in or around breast, or any uncontrolled symptoms that last over 2-3 weeks at [phone].  9. Your exam is negative.  10. It was nice seeing you today, Pallavi. Please call with any concerns.  Thank you for choosing [facility] for your care.

## 2024-09-23 RX ORDER — ANASTROZOLE 1 MG/1
1 TABLET ORAL DAILY
Qty: 90 TABLET | Refills: 3 | Status: SHIPPED
Start: 2024-09-23 | End: 2024-09-24 | Stop reason: RX

## 2024-09-24 DIAGNOSIS — Z85.3 PERSONAL HISTORY OF BREAST CANCER: Primary | ICD-10-CM

## 2024-09-24 RX ORDER — ANASTROZOLE 1 MG/1
1 TABLET ORAL DAILY
Qty: 90 TABLET | Refills: 3 | Status: SHIPPED | OUTPATIENT
Start: 2024-09-24

## 2024-10-21 ENCOUNTER — HOSPITAL ENCOUNTER (OUTPATIENT)
Dept: RADIOLOGY | Facility: CLINIC | Age: 64
Discharge: HOME | End: 2024-10-21
Payer: COMMERCIAL

## 2024-10-21 DIAGNOSIS — R91.1 LUNG NODULE SEEN ON IMAGING STUDY: ICD-10-CM

## 2024-10-21 PROCEDURE — 71250 CT THORAX DX C-: CPT | Performed by: RADIOLOGY

## 2024-10-21 PROCEDURE — 71250 CT THORAX DX C-: CPT

## 2024-10-23 ENCOUNTER — DOCUMENTATION (OUTPATIENT)
Dept: PULMONOLOGY | Facility: HOSPITAL | Age: 64
End: 2024-10-23
Payer: COMMERCIAL

## 2024-10-23 NOTE — PROGRESS NOTES
Lung nodule unchanged dating back to 2023.  Will repeat the CT scan in about a year or so.  The patient is aware

## 2024-10-28 ENCOUNTER — APPOINTMENT (OUTPATIENT)
Dept: OBSTETRICS AND GYNECOLOGY | Facility: CLINIC | Age: 64
End: 2024-10-28
Payer: COMMERCIAL

## 2024-10-29 ENCOUNTER — APPOINTMENT (OUTPATIENT)
Dept: DERMATOLOGY | Facility: CLINIC | Age: 64
End: 2024-10-29
Payer: COMMERCIAL

## 2024-10-29 DIAGNOSIS — L71.9 ROSACEA: ICD-10-CM

## 2024-10-29 DIAGNOSIS — Z12.83 SCREENING EXAM FOR SKIN CANCER: ICD-10-CM

## 2024-10-29 DIAGNOSIS — L81.4 LENTIGO: ICD-10-CM

## 2024-10-29 DIAGNOSIS — D22.9 MULTIPLE BENIGN NEVI: Primary | ICD-10-CM

## 2024-10-29 DIAGNOSIS — L82.1 SEBORRHEIC KERATOSIS: ICD-10-CM

## 2024-10-29 PROCEDURE — 99214 OFFICE O/P EST MOD 30 MIN: CPT | Performed by: DERMATOLOGY

## 2024-10-29 PROCEDURE — 1036F TOBACCO NON-USER: CPT | Performed by: DERMATOLOGY

## 2024-10-29 RX ORDER — METRONIDAZOLE 7.5 MG/G
1 CREAM TOPICAL 2 TIMES DAILY
Qty: 45 G | Refills: 11 | Status: SHIPPED | OUTPATIENT
Start: 2024-10-29

## 2024-10-29 RX ORDER — TRETINOIN 0.25 MG/G
CREAM TOPICAL NIGHTLY
Qty: 45 G | Refills: 3 | Status: SHIPPED | OUTPATIENT
Start: 2024-10-29 | End: 2025-10-29

## 2024-10-29 ASSESSMENT — DERMATOLOGY PATIENT ASSESSMENT
ARE YOU ON BIRTH CONTROL: NO
ARE YOU TRYING TO GET PREGNANT: NO
DO YOU HAVE IRREGULAR MENSTRUAL CYCLES: NO
DO YOU USE SUNSCREEN: OCCASIONALLY
WHERE ARE THESE NEW OR CHANGING LESIONS LOCATED: NECK AND CHEST
HAVE YOU HAD OR DO YOU HAVE A STAPH INFECTION: NO
HAVE YOU HAD OR DO YOU HAVE VASCULAR DISEASE: NO
ARE YOU AN ORGAN TRANSPLANT RECIPIENT: NO
DO YOU HAVE ANY NEW OR CHANGING LESIONS: YES
DO YOU USE A TANNING BED: NO

## 2024-10-29 ASSESSMENT — ITCH NUMERIC RATING SCALE: HOW SEVERE IS YOUR ITCHING?: 0

## 2024-10-29 ASSESSMENT — DERMATOLOGY QUALITY OF LIFE (QOL) ASSESSMENT
RATE HOW EMOTIONALLY BOTHERED YOU ARE BY YOUR SKIN PROBLEM (FOR EXAMPLE, WORRY, EMBARRASSMENT, FRUSTRATION): 0 - NEVER BOTHERED
DATE THE QUALITY-OF-LIFE ASSESSMENT WAS COMPLETED: 67142
RATE HOW BOTHERED YOU ARE BY EFFECTS OF YOUR SKIN PROBLEMS ON YOUR ACTIVITIES (EG, GOING OUT, ACCOMPLISHING WHAT YOU WANT, WORK ACTIVITIES OR YOUR RELATIONSHIPS WITH OTHERS): 0 - NEVER BOTHERED
RATE HOW BOTHERED YOU ARE BY SYMPTOMS OF YOUR SKIN PROBLEM (EG, ITCHING, STINGING BURNING, HURTING OR SKIN IRRITATION): 0 - NEVER BOTHERED
ARE THERE EXCLUSIONS OR EXCEPTIONS FOR THE QUALITY OF LIFE ASSESSMENT: NO

## 2024-10-29 ASSESSMENT — PATIENT GLOBAL ASSESSMENT (PGA): PATIENT GLOBAL ASSESSMENT: PATIENT GLOBAL ASSESSMENT:  1 - CLEAR

## 2024-11-04 ENCOUNTER — APPOINTMENT (OUTPATIENT)
Dept: OBSTETRICS AND GYNECOLOGY | Facility: CLINIC | Age: 64
End: 2024-11-04
Payer: COMMERCIAL

## 2024-12-02 ENCOUNTER — APPOINTMENT (OUTPATIENT)
Dept: OBSTETRICS AND GYNECOLOGY | Facility: CLINIC | Age: 64
End: 2024-12-02
Payer: COMMERCIAL

## 2024-12-02 VITALS
WEIGHT: 110 LBS | HEIGHT: 62 IN | DIASTOLIC BLOOD PRESSURE: 70 MMHG | BODY MASS INDEX: 20.24 KG/M2 | SYSTOLIC BLOOD PRESSURE: 122 MMHG

## 2024-12-02 DIAGNOSIS — N95.2 VAGINAL ATROPHY: ICD-10-CM

## 2024-12-02 DIAGNOSIS — E78.2 MODERATE MIXED HYPERLIPIDEMIA NOT REQUIRING STATIN THERAPY: ICD-10-CM

## 2024-12-02 DIAGNOSIS — Z01.419 WELL WOMAN EXAM WITH ROUTINE GYNECOLOGICAL EXAM: Primary | ICD-10-CM

## 2024-12-02 DIAGNOSIS — E55.9 VITAMIN D DEFICIENCY: ICD-10-CM

## 2024-12-02 DIAGNOSIS — C50.911 MALIGNANT NEOPLASM OF RIGHT FEMALE BREAST, UNSPECIFIED ESTROGEN RECEPTOR STATUS, UNSPECIFIED SITE OF BREAST: ICD-10-CM

## 2024-12-02 DIAGNOSIS — R87.611 ATYPICAL SQUAMOUS CELLS CANNOT EXCLUDE HIGH GRADE SQUAMOUS INTRAEPITHELIAL LESION ON CYTOLOGIC SMEAR OF CERVIX (ASC-H): ICD-10-CM

## 2024-12-02 PROCEDURE — 87624 HPV HI-RISK TYP POOLED RSLT: CPT

## 2024-12-02 PROCEDURE — 3008F BODY MASS INDEX DOCD: CPT | Performed by: ADVANCED PRACTICE MIDWIFE

## 2024-12-02 PROCEDURE — 99396 PREV VISIT EST AGE 40-64: CPT | Performed by: ADVANCED PRACTICE MIDWIFE

## 2024-12-02 PROCEDURE — 1036F TOBACCO NON-USER: CPT | Performed by: ADVANCED PRACTICE MIDWIFE

## 2024-12-02 PROCEDURE — 88175 CYTOPATH C/V AUTO FLUID REDO: CPT

## 2024-12-02 ASSESSMENT — ENCOUNTER SYMPTOMS
ALLERGIC/IMMUNOLOGIC NEGATIVE: 0
CARDIOVASCULAR NEGATIVE: 0
NEUROLOGICAL NEGATIVE: 0
CONSTITUTIONAL NEGATIVE: 0
GASTROINTESTINAL NEGATIVE: 0
PSYCHIATRIC NEGATIVE: 0
HEMATOLOGIC/LYMPHATIC NEGATIVE: 0
MUSCULOSKELETAL NEGATIVE: 0
EYES NEGATIVE: 0
ENDOCRINE NEGATIVE: 0
RESPIRATORY NEGATIVE: 0

## 2024-12-02 NOTE — PROGRESS NOTES
"Subjective   Pallavi Wallace is a 64 y.o. female who is here for Annual Exam (Last PAP= DUE TODAY //Last MAMMO= PAST DUE //Last Colonoscopy= 2022//Natan RDZ MA, II/).     Concerns today:  Issues with meds for breast cancer     Patient is postmenopausal.   Experiencing menopause symptoms? Sleep problems  The patient is not taking hormone therapy.   Any Contraindications to HT:  Yes - personal h/o breast cancer and estrogen sensitive cancer     Sexual Activity: sexually active, male partners; Patient reports 1 partners in the last 12 months.  Pain with intercourse? A little recommended fake progesterone lubricant   Loss of desire? No   Able to have an orgasm? No     History of prior STI: none  Desires STI screening? No    Current contraception: none    Last pap:   : NILM, HPV NEG- Repeat co testing in 12 months. (10/2024)  : ASC-US, HPV neg            LEEP VASQUEZ 2-3  : ASC-H  2019: ASC-US, HPV 16/ Other positive- No VASQUEZ on colposcopy  2011: ASC-US, HPV Neg  2010: ASC-US  2009: ASC-US, HPV neg  2008: NILM, HPV Neg    History of abnormal Pap smear: yes - see above  Family history of uterine or ovarian cancer: no    Last mammogram:   History of abnormal mammogram: yes -    Family history of breast cancer: yes - self   OB History    Para Term  AB Living   2 2 0 0 0 2   SAB IAB Ectopic Multiple Live Births   0 0 0 0 0      Objective   /70   Ht 1.575 m (5' 2\")   Wt 49.9 kg (110 lb)    Physical Exam  Vitals reviewed.   Constitutional:       General: She is not in acute distress.     Appearance: Normal appearance.   HENT:      Head: Normocephalic.   Eyes:      Pupils: Pupils are equal, round, and reactive to light.   Cardiovascular:      Rate and Rhythm: Normal rate and regular rhythm.      Heart sounds: No murmur heard.     No gallop.   Pulmonary:      Effort: Pulmonary effort is normal. No respiratory distress.      Breath sounds: Normal breath sounds. No stridor. No wheezing, " rhonchi or rales.   Chest:      Chest wall: No tenderness or crepitus.   Breasts:     Right: No inverted nipple, mass, nipple discharge, skin change or tenderness.      Left: Normal. No inverted nipple, mass, nipple discharge, skin change or tenderness.   Abdominal:      General: Abdomen is flat.      Palpations: Abdomen is soft. There is no mass.      Tenderness: There is no abdominal tenderness. There is no right CVA tenderness, left CVA tenderness or rebound.      Hernia: No hernia is present.   Genitourinary:     General: Normal vulva.      Pubic Area: No rash.       Labia:         Right: No rash, tenderness, lesion or injury.         Left: No rash, tenderness, lesion or injury.       Urethra: No prolapse or urethral swelling.      Vagina: Normal. No foreign body. No erythema, tenderness, bleeding, lesions or prolapsed vaginal walls.      Cervix: No cervical motion tenderness, friability or lesion.      Uterus: Normal. Not enlarged, not tender and no uterine prolapse.       Adnexa: Right adnexa normal and left adnexa normal.        Right: No mass or tenderness.          Left: No mass or tenderness.        Rectum: Normal.      Comments: EGBUS WNL   Musculoskeletal:      Cervical back: Neck supple. No rigidity or tenderness.   Skin:     General: Skin is warm and dry.   Neurological:      Mental Status: She is alert.   Psychiatric:         Mood and Affect: Mood normal.         Behavior: Behavior normal.         Thought Content: Thought content normal.         Judgment: Judgment normal.          Assessment/Plan   Problem List Items Addressed This Visit             ICD-10-CM       Ob-Gyn Problems    Vaginal atrophy N95.2    Malignant neoplasm of right female breast, unspecified estrogen receptor status, unspecified site of breast C50.911    Relevant Orders    BI US breast complete bilateral    Abnormal Pap smear of cervix R87.619    Relevant Orders    THINPREP PAP TEST       Other    Vitamin D deficiency E55.9      Other Visit Diagnoses         Codes    Well woman exam with routine gynecological exam    -  Primary Z01.419    Relevant Orders    THINPREP PAP TEST    BI US breast complete bilateral          No follow-ups on file.  Liliana Cooley, ASUNCION-ROSEANNAM

## 2024-12-03 NOTE — PROGRESS NOTES
Subjective   Patient ID: Pallavi Wallace is a 64 y.o. female who presents for Annual Exam.    Last Annual Physical: Oct 2023  Last Dental Visit: Oct 2024  Last Eye exam: Dec 2023  Hearing Concerns: None  Diet: Well balanced  Exercise Routine: Regular      HPI the patient for those up closely with OB for her abnormal Paps she is doing well otherwise and the trazodone is working well for her.    Review of Systems  Constitutional: No fever or chills, No Night Sweats  Eyes: No Blurry Vision or Eye sight problems  ENT: No Nasal Discharge, Hoarseness, sore throat  Cardiovascular: no chest pain, no palpitations and no syncope.   Respiratory: no cough, no shortness of breath during exertion and no shortness of breath at rest.   Gastrointestinal: no abdominal pain, no nausea and no vomiting.   : No vaginal discharge, burning with urination, no blood in urine or stools  Skin: No Skin rashes or Lesions  Neuro: No Headache, no dizziness or Numbness or tingling  Psych: No Anxiety, depression or sleeping problems  Heme: No Easy bleeding or brusing.     Objective   /73   Pulse 81   Wt 50.8 kg (112 lb)   SpO2 99%   BMI 20.49 kg/m²     Physical Exam  Constitutional: Alert and in no acute distress. Well developed, well nourished.   Head and Face: Head and face: Normal.    Eyes: Normal external exam. Pupils were equal in size, round, reactive to light (PERRL) with normal accommodation and extraocular movements intact (EOMI).   Ears, Nose, Mouth, and Throat: External inspection of ears and nose: Normal.  Hearing: Normal.  Nasal mucosa, septum, and turbinates: Normal.  Lips, teeth, and gums: Normal.  Oropharynx: Normal.   Neck: No neck mass was observed. Supple. Thyroid not enlarged and there were no palpable thyroid nodules.   Cardiovascular: Heart rate and rhythm were normal, normal S1 and S2. Pedal pulses: Normal. No peripheral edema.   Pulmonary: No respiratory distress. Clear bilateral breath sounds.   Abdomen: Soft  nontender; no abdominal mass palpated. Normal bowel sounds. No organomegaly.   Musculoskeletal: No joint swelling seen, normal movements of all extremities. Range of motion: Normal.  Muscle strength/tone: Normal.    Skin: Normal skin color and pigmentation, normal skin turgor, and no rash.   Neurologic: Deep tendon reflexes were 2+ and symmetric.   Psychiatric: Judgment and insight: Intact. Mood and affect: Normal.  Lymphatic: No cervical lymphadenopathy. Palpation of lymph nodes in axillae: Normal.  Palpation of lymph nodes in groin: Normal.    Lab Results   Component Value Date    WBC 5.6 09/27/2023    HGB 13.5 09/27/2023    HCT 43.3 09/27/2023     09/27/2023    CHOL 201 (H) 09/27/2023    TRIG 119 09/27/2023    HDL 65.2 09/27/2023    ALT 14 09/27/2023    AST 20 09/27/2023     09/27/2023    K 4.9 09/27/2023     09/27/2023    CREATININE 0.76 09/27/2023    BUN 15 09/27/2023    CO2 27 09/27/2023    TSH 6.48 (H) 09/27/2023    HGBA1C 5.3 09/27/2023       CT chest wo IV contrast  Narrative: Interpreted By:  Alvarado Marcos and Liller Gregory   STUDY:  CT CHEST WO IV CONTRAST;  10/21/2024 4:07 pm      INDICATION:  Signs/Symptoms:LLL nodule.      COMPARISON:  CT chest 02/08/2024, 11/02/2023, 10/26/2023      ACCESSION NUMBER(S):  [id number]      ORDERING CLINICIAN:  JIMMY PANDA      TECHNIQUE:  Helical data acquisition of the chest was obtained without  intravenous contrast. Images were reformatted in axial, coronal, and  sagittal planes.      FINDINGS:  LUNGS AND AIRWAYS:  The trachea and central airways are patent. No endobronchial lesion  is seen.      The bilateral lungs are clear without evidence of focal  consolidation, pleural effusion, or pneumothorax. Accessory azygos  fissure is again noted. Mild biapical scarring seen. Redemonstration  of 8 mm solid subpleural nodule within the left lower lobe (series 3  image 02/02/2036) which is similar in size and morphology dating back  to CT  cardiac score from 10/26/2023. No new, discrete nodularity is  identified.      MEDIASTINUM AND SALOMON, LOWER NECK AND AXILLA:  The visualized thyroid gland is within normal limits.  No evidence of thoracic lymphadenopathy by CT criteria.  Esophagus is patulous but otherwise unremarkable.      HEART AND VESSELS:  The thoracic aorta normal in course and caliber.No significant  atherosclerotic disease of the thoracic aorta. Main pulmonary artery  and its branches are normal in caliber. No significant coronary  artery calcifications are seen. Please note, the study is not  optimized for evaluation of coronary arteries. The cardiac chambers  are not enlarged. There is a trace pericardial effusion, similar to  prior exam.      UPPER ABDOMEN:  The visualized subdiaphragmatic structures demonstrate no remarkable  findings.      CHEST WALL AND OSSEOUS STRUCTURES:  Chest wall is within normal limits.  No acute osseous pathology.There are no suspicious osseous lesions.  Minimal discogenic degenerative changes.      Impression: 1. Similar appearance of 8 mm solid subpleural pulmonary nodule  within the left lower lobe dating back to 2023. A repeat CT chest  should be obtained at 18-24 months to assess for stability per  Fleischner guidelines.  2. No focal consolidation, pleural effusion, or pneumothorax.      I personally reviewed the images/study and I agree with the findings  as stated above by resident physician, Dr. Mark Johnson.      MACRO:  none      Signed by: Alvarado Marcos 10/22/2024 7:42 PM  Dictation workstation:   [id number]      Assessment/Plan   Diagnoses and all orders for this visit:  Annual physical exam  -     CBC; Future  -     Comprehensive Metabolic Panel; Future  -     Hemoglobin A1C; Future  -     Lipid Panel; Future  -     TSH; Future  -     T4, free; Future  -     T3, free; Future  Pure hypercholesterolemia  Primary insomnia  -     traZODone (Desyrel) 50 mg tablet; Take 1.5 tablets (75 mg) by  mouth as needed at bedtime for sleep.  Rosacea, unspecified  Vaginal atrophy  Lung nodule seen on imaging study  Vitamin D deficiency  -     Vitamin B12; Future  -     Vitamin D 25-Hydroxy,Total (for eval of Vitamin D levels); Future        Dear Pallavi Wallace     It was my pleasure to take care of you today in the office. Below are the things we discussed today:    1. 1. Immunizations: Yearly Flu shot is recommended.          a: COVID: Please get booster from the pharmacy         b: Tetanus: Up-to-date         c: Shingrix: Up-to-date    2. Blood Work: Ordered  3. Seen your dentist twice a year  4. Yearly Eye exam is recommended    5. BMI: Normal  6: Diet recommendations:   Eat Clean, Try to have as many home cooked meals as possible  Avoid processed foods which contain excess calories, sugar, and sodium.    7. Exercise recommendations:   150 minutes a week to maintain your weight     If you have to loose weight, you need a better diet and exercise plan.     8. Supplements recommended:  a - Calcium 600 mg up to twice a day to get a total of 1200 mg. Each 8 oz of milk or yogurt or 1 oz of cheese, 1 Banana, 1 serving of green Leafy vegetable has about 300 mg of Calcium, so you may subtract that amount. Calcium citrate is the only acceptable supplement to take if you take an acid suppressing medication like Prilosec; otherwise Calcium carbonate is acceptable too (It can cause Constipation).   b - Vitamin D - 2000 IU daily     9. Please get your Living will / Advance directive completed if you do not have one already. Please make sure our office has a copy of the latest one.     10. Colonoscopy: Uptodate, repeat in Feb 2032  11. Mammogram: Ordered  12. PAP: Last PAP Dec 2024 with OB  13. DEXA: Up-todate  14: Skin Check: Please see Dermatology once a year for a Skin Check.     15.  Insomnia, continue trazodone    16.  History of breast cancer, patient is on anastrozole    17.  Rosacea, patient using metronidazole cream  as needed.    18.  Vaginal atrophy, patient using vaginal estrogen cream as needed    Follow up in one year for a Physical. Please call the office before your Physical to see if you need blood work completed prior to your physical.     Please call me if any questions arise from now until your next visit. I will call you after I am done seeing patients. A Doctor is always available by phone when the office is closed. Please feel free to call for help with any problem that you feel shouldn't wait until the office re-opens.     Rachel Reed MD

## 2024-12-04 ENCOUNTER — OFFICE VISIT (OUTPATIENT)
Dept: PRIMARY CARE | Facility: CLINIC | Age: 64
End: 2024-12-04
Payer: COMMERCIAL

## 2024-12-04 VITALS
WEIGHT: 112 LBS | BODY MASS INDEX: 20.49 KG/M2 | DIASTOLIC BLOOD PRESSURE: 73 MMHG | HEART RATE: 81 BPM | SYSTOLIC BLOOD PRESSURE: 123 MMHG | OXYGEN SATURATION: 99 %

## 2024-12-04 DIAGNOSIS — R91.1 LUNG NODULE SEEN ON IMAGING STUDY: ICD-10-CM

## 2024-12-04 DIAGNOSIS — L71.9 ROSACEA, UNSPECIFIED: ICD-10-CM

## 2024-12-04 DIAGNOSIS — E55.9 VITAMIN D DEFICIENCY: ICD-10-CM

## 2024-12-04 DIAGNOSIS — E78.00 PURE HYPERCHOLESTEROLEMIA: ICD-10-CM

## 2024-12-04 DIAGNOSIS — N95.2 VAGINAL ATROPHY: ICD-10-CM

## 2024-12-04 DIAGNOSIS — F51.01 PRIMARY INSOMNIA: ICD-10-CM

## 2024-12-04 DIAGNOSIS — Z00.00 ANNUAL PHYSICAL EXAM: Primary | ICD-10-CM

## 2024-12-04 PROCEDURE — 99396 PREV VISIT EST AGE 40-64: CPT | Performed by: FAMILY MEDICINE

## 2024-12-04 PROCEDURE — 1036F TOBACCO NON-USER: CPT | Performed by: FAMILY MEDICINE

## 2024-12-04 RX ORDER — TRAZODONE HYDROCHLORIDE 50 MG/1
75 TABLET ORAL NIGHTLY PRN
Qty: 135 TABLET | Refills: 3 | Status: SHIPPED | OUTPATIENT
Start: 2024-12-04

## 2024-12-04 ASSESSMENT — PATIENT HEALTH QUESTIONNAIRE - PHQ9
SUM OF ALL RESPONSES TO PHQ9 QUESTIONS 1 AND 2: 0
1. LITTLE INTEREST OR PLEASURE IN DOING THINGS: NOT AT ALL
2. FEELING DOWN, DEPRESSED OR HOPELESS: NOT AT ALL

## 2024-12-06 ENCOUNTER — APPOINTMENT (OUTPATIENT)
Dept: RADIOLOGY | Facility: CLINIC | Age: 64
End: 2024-12-06
Payer: COMMERCIAL

## 2024-12-10 ENCOUNTER — APPOINTMENT (OUTPATIENT)
Dept: OBSTETRICS AND GYNECOLOGY | Facility: CLINIC | Age: 64
End: 2024-12-10
Payer: COMMERCIAL

## 2024-12-11 ENCOUNTER — HOSPITAL ENCOUNTER (OUTPATIENT)
Dept: RADIOLOGY | Facility: CLINIC | Age: 64
Discharge: HOME | End: 2024-12-11
Payer: COMMERCIAL

## 2024-12-11 VITALS — HEIGHT: 62 IN | BODY MASS INDEX: 20.61 KG/M2 | WEIGHT: 112 LBS

## 2024-12-11 DIAGNOSIS — C50.911 MALIGNANT NEOPLASM OF RIGHT FEMALE BREAST, UNSPECIFIED ESTROGEN RECEPTOR STATUS, UNSPECIFIED SITE OF BREAST: ICD-10-CM

## 2024-12-11 PROCEDURE — 77063 BREAST TOMOSYNTHESIS BI: CPT | Performed by: RADIOLOGY

## 2024-12-11 PROCEDURE — 77063 BREAST TOMOSYNTHESIS BI: CPT

## 2024-12-11 PROCEDURE — 77067 SCR MAMMO BI INCL CAD: CPT | Performed by: RADIOLOGY

## 2024-12-17 ENCOUNTER — APPOINTMENT (OUTPATIENT)
Dept: PRIMARY CARE | Facility: CLINIC | Age: 64
End: 2024-12-17
Payer: COMMERCIAL

## 2024-12-26 ENCOUNTER — APPOINTMENT (OUTPATIENT)
Dept: OPHTHALMOLOGY | Facility: CLINIC | Age: 64
End: 2024-12-26
Payer: COMMERCIAL

## 2025-01-16 ENCOUNTER — APPOINTMENT (OUTPATIENT)
Dept: OPHTHALMOLOGY | Facility: CLINIC | Age: 65
End: 2025-01-16
Payer: COMMERCIAL

## 2025-01-16 DIAGNOSIS — H52.03 HYPERMETROPIA OF BOTH EYES: ICD-10-CM

## 2025-01-16 DIAGNOSIS — H52.4 PRESBYOPIA: ICD-10-CM

## 2025-01-16 DIAGNOSIS — H52.223 REGULAR ASTIGMATISM OF BOTH EYES: ICD-10-CM

## 2025-01-16 DIAGNOSIS — H02.88B MEIBOMIAN GLAND DYSFUNCTION LEFT EYE, UPPER AND LOWER EYELIDS: ICD-10-CM

## 2025-01-16 DIAGNOSIS — H02.88A MEIBOMIAN GLAND DYSFUNCTION RIGHT EYE, UPPER AND LOWER EYELIDS: Primary | ICD-10-CM

## 2025-01-16 PROCEDURE — 92015 DETERMINE REFRACTIVE STATE: CPT | Performed by: OPTOMETRIST

## 2025-01-16 PROCEDURE — 99214 OFFICE O/P EST MOD 30 MIN: CPT | Performed by: OPTOMETRIST

## 2025-01-16 ASSESSMENT — ENCOUNTER SYMPTOMS
CONSTITUTIONAL NEGATIVE: 0
CARDIOVASCULAR NEGATIVE: 0
ENDOCRINE NEGATIVE: 0
RESPIRATORY NEGATIVE: 0
GASTROINTESTINAL NEGATIVE: 0
MUSCULOSKELETAL NEGATIVE: 0
NEUROLOGICAL NEGATIVE: 0
PSYCHIATRIC NEGATIVE: 0
EYES NEGATIVE: 0
HEMATOLOGIC/LYMPHATIC NEGATIVE: 0
ALLERGIC/IMMUNOLOGIC NEGATIVE: 0

## 2025-01-16 ASSESSMENT — REFRACTION_MANIFEST
OD_CYLINDER: -0.50
OD_AXIS: 105
OD_SPHERE: +0.50
OD_ADD: +2.25
OS_SPHERE: +0.75
OS_AXIS: 085
OS_CYLINDER: -0.50
OS_ADD: +2.25

## 2025-01-16 ASSESSMENT — VISUAL ACUITY
OS_CC+: -2
METHOD: SNELLEN - LINEAR
OD_CC+: -2
OD_CC: 20/20
OS_CC: 20/20

## 2025-01-16 ASSESSMENT — REFRACTION
OD_AXIS: 105
OD_SPHERE: +1.00
OS_AXIS: 085
OS_SPHERE: +0.75
OD_ADD: +2.25
OS_ADD: +2.25
OS_CYLINDER: -0.75
OD_CYLINDER: -0.75

## 2025-01-16 ASSESSMENT — CONF VISUAL FIELD
OS_NORMAL: 1
OD_NORMAL: 1
METHOD: COUNTING FINGERS
OD_INFERIOR_NASAL_RESTRICTION: 0
OD_INFERIOR_TEMPORAL_RESTRICTION: 0
OS_SUPERIOR_NASAL_RESTRICTION: 0
OD_SUPERIOR_TEMPORAL_RESTRICTION: 0
OD_SUPERIOR_NASAL_RESTRICTION: 0
OS_SUPERIOR_TEMPORAL_RESTRICTION: 0
OS_INFERIOR_NASAL_RESTRICTION: 0
OS_INFERIOR_TEMPORAL_RESTRICTION: 0

## 2025-01-16 ASSESSMENT — TONOMETRY
OS_IOP_MMHG: 14
IOP_METHOD: GOLDMANN APPLANATION
OD_IOP_MMHG: 14

## 2025-01-16 ASSESSMENT — SLIT LAMP EXAM - LIDS
COMMENTS: NORMAL, 2+ MGD
COMMENTS: NORMAL, 2+ MGD

## 2025-01-16 ASSESSMENT — CUP TO DISC RATIO
OD_RATIO: 0.3
OS_RATIO: 0.3

## 2025-01-16 ASSESSMENT — EXTERNAL EXAM - RIGHT EYE: OD_EXAM: NORMAL

## 2025-01-16 ASSESSMENT — TEAR BREAK UP TIME (TBUT)
OD_TBUT: 5
OS_TBUT: 5

## 2025-01-16 ASSESSMENT — EXTERNAL EXAM - LEFT EYE: OS_EXAM: NORMAL

## 2025-01-16 NOTE — PROGRESS NOTES
Assessment/Plan   Diagnoses and all orders for this visit:  Meibomian gland dysfunction right eye, upper and lower eyelids  Meibomian gland dysfunction left eye, upper and lower eyelids  Recommend hot compresses. Discussed that this is a chronic problem which requires chronic/consistent treatment.  Recommend use of artificial tears bid-qid OU. Discussed ATs work best when used consistently multiple times a day. Discussed brand options and preserved vs. PF. Coupon given.    Hypermetropia of both eyes  Regular astigmatism of both eyes  Presbyopia  Pt consents to receiving glasses Rx today. Patient's/guardian's signature obtained to acknowledge and confirm that a paper copy of glasses Rx was given to patient in compliance with [facility] Eyeglass Rule. Electronic copy of Rx will also be available via [email or web address].   Spec Rx released. Optional to fill as patient not currently having any difficulty. Ok to use OTC readers as desired+1.50-+1.75. Ocular health WNL for age OU. Monitor 1 year. Refraction billed today.  Option for DISTANCE VISION ONLY glasses for driving at night

## 2025-01-21 ENCOUNTER — APPOINTMENT (OUTPATIENT)
Dept: PRIMARY CARE | Facility: CLINIC | Age: 65
End: 2025-01-21
Payer: COMMERCIAL

## 2025-03-18 ENCOUNTER — APPOINTMENT (OUTPATIENT)
Dept: OPHTHALMOLOGY | Facility: CLINIC | Age: 65
End: 2025-03-18
Payer: COMMERCIAL

## 2025-03-21 LAB
25(OH)D3+25(OH)D2 SERPL-MCNC: 111 NG/ML (ref 30–100)
ALBUMIN SERPL-MCNC: 4.8 G/DL (ref 3.6–5.1)
ALP SERPL-CCNC: 87 U/L (ref 37–153)
ALT SERPL-CCNC: 13 U/L (ref 6–29)
ANION GAP SERPL CALCULATED.4IONS-SCNC: 6 MMOL/L (CALC) (ref 7–17)
AST SERPL-CCNC: 18 U/L (ref 10–35)
BILIRUB SERPL-MCNC: 0.6 MG/DL (ref 0.2–1.2)
BUN SERPL-MCNC: 18 MG/DL (ref 7–25)
CALCIUM SERPL-MCNC: 9.8 MG/DL (ref 8.6–10.4)
CHLORIDE SERPL-SCNC: 103 MMOL/L (ref 98–110)
CHOLEST SERPL-MCNC: 235 MG/DL
CHOLEST/HDLC SERPL: 3 (CALC)
CO2 SERPL-SCNC: 32 MMOL/L (ref 20–32)
CREAT SERPL-MCNC: 0.8 MG/DL (ref 0.5–1.05)
EGFRCR SERPLBLD CKD-EPI 2021: 83 ML/MIN/1.73M2
ERYTHROCYTE [DISTWIDTH] IN BLOOD BY AUTOMATED COUNT: 11.9 % (ref 11–15)
EST. AVERAGE GLUCOSE BLD GHB EST-MCNC: 108 MG/DL
EST. AVERAGE GLUCOSE BLD GHB EST-SCNC: 6 MMOL/L
GLUCOSE SERPL-MCNC: 97 MG/DL (ref 65–99)
HBA1C MFR BLD: 5.4 % OF TOTAL HGB
HCT VFR BLD AUTO: 42 % (ref 35–45)
HDLC SERPL-MCNC: 78 MG/DL
HGB BLD-MCNC: 13.9 G/DL (ref 11.7–15.5)
LDLC SERPL CALC-MCNC: 141 MG/DL (CALC)
MCH RBC QN AUTO: 30.8 PG (ref 27–33)
MCHC RBC AUTO-ENTMCNC: 33.1 G/DL (ref 32–36)
MCV RBC AUTO: 92.9 FL (ref 80–100)
NONHDLC SERPL-MCNC: 157 MG/DL (CALC)
PLATELET # BLD AUTO: 210 THOUSAND/UL (ref 140–400)
PMV BLD REES-ECKER: 10 FL (ref 7.5–12.5)
POTASSIUM SERPL-SCNC: 4.3 MMOL/L (ref 3.5–5.3)
PROT SERPL-MCNC: 6.9 G/DL (ref 6.1–8.1)
RBC # BLD AUTO: 4.52 MILLION/UL (ref 3.8–5.1)
SODIUM SERPL-SCNC: 141 MMOL/L (ref 135–146)
T3FREE SERPL-MCNC: 3 PG/ML (ref 2.3–4.2)
T4 FREE SERPL-MCNC: 1.2 NG/DL (ref 0.8–1.8)
TRIGL SERPL-MCNC: 72 MG/DL
TSH SERPL-ACNC: 4.6 MIU/L (ref 0.4–4.5)
VIT B12 SERPL-MCNC: 595 PG/ML (ref 200–1100)
WBC # BLD AUTO: 4.9 THOUSAND/UL (ref 3.8–10.8)

## 2025-07-25 ENCOUNTER — HOSPITAL ENCOUNTER (OUTPATIENT)
Dept: RADIOLOGY | Facility: CLINIC | Age: 65
Discharge: HOME | End: 2025-07-25
Payer: COMMERCIAL

## 2025-07-25 ENCOUNTER — OFFICE VISIT (OUTPATIENT)
Dept: PRIMARY CARE | Facility: CLINIC | Age: 65
End: 2025-07-25
Payer: COMMERCIAL

## 2025-07-25 VITALS
BODY MASS INDEX: 20.8 KG/M2 | OXYGEN SATURATION: 98 % | WEIGHT: 113 LBS | HEIGHT: 62 IN | SYSTOLIC BLOOD PRESSURE: 132 MMHG | DIASTOLIC BLOOD PRESSURE: 86 MMHG | HEART RATE: 72 BPM

## 2025-07-25 DIAGNOSIS — E78.00 PURE HYPERCHOLESTEROLEMIA: Primary | ICD-10-CM

## 2025-07-25 DIAGNOSIS — R94.6 ABNORMAL THYROID EXAM: ICD-10-CM

## 2025-07-25 DIAGNOSIS — M54.50 ACUTE LEFT-SIDED LOW BACK PAIN WITHOUT SCIATICA: ICD-10-CM

## 2025-07-25 DIAGNOSIS — E55.9 VITAMIN D DEFICIENCY: ICD-10-CM

## 2025-07-25 DIAGNOSIS — N95.8 OTHER SPECIFIED MENOPAUSAL AND PERIMENOPAUSAL DISORDERS: ICD-10-CM

## 2025-07-25 DIAGNOSIS — R73.9 ELEVATED BLOOD SUGAR: ICD-10-CM

## 2025-07-25 DIAGNOSIS — E53.8 VITAMIN B12 DEFICIENCY: ICD-10-CM

## 2025-07-25 DIAGNOSIS — F51.01 PRIMARY INSOMNIA: ICD-10-CM

## 2025-07-25 PROBLEM — C50.911 MALIGNANT NEOPLASM OF RIGHT FEMALE BREAST, UNSPECIFIED ESTROGEN RECEPTOR STATUS, UNSPECIFIED SITE OF BREAST: Status: RESOLVED | Noted: 2023-10-02 | Resolved: 2025-07-25

## 2025-07-25 PROBLEM — R87.619 ABNORMAL PAP SMEAR OF CERVIX: Status: RESOLVED | Noted: 2023-10-02 | Resolved: 2025-07-25

## 2025-07-25 PROCEDURE — 72114 X-RAY EXAM L-S SPINE BENDING: CPT

## 2025-07-25 PROCEDURE — 3008F BODY MASS INDEX DOCD: CPT | Performed by: FAMILY MEDICINE

## 2025-07-25 PROCEDURE — 99214 OFFICE O/P EST MOD 30 MIN: CPT | Performed by: FAMILY MEDICINE

## 2025-07-25 RX ORDER — CYCLOBENZAPRINE HCL 10 MG
10 TABLET ORAL NIGHTLY PRN
Qty: 30 TABLET | Refills: 0 | Status: SHIPPED | OUTPATIENT
Start: 2025-07-25

## 2025-07-25 RX ORDER — ROSUVASTATIN CALCIUM 10 MG/1
10 TABLET, COATED ORAL EVERY OTHER DAY
Qty: 45 TABLET | Refills: 1 | Status: SHIPPED | OUTPATIENT
Start: 2025-07-25

## 2025-07-25 NOTE — PROGRESS NOTES
"Subjective   Patient ID: Pallavi Wallace is a 64 y.o. female who presents for Back Pain.    HPI   The patient reports that she is taking rosuvastatin for hyperlipidemia 3 times weekly, trazodone for sleep, estradiol vaginal cream and anastrozole for her history of breast cancer. She is taking these medications as prescribed and is tolerating it well.      Blood pressure is stable at this time.  Today she presents with concerns over left lower back pain.  She felt fine until a few days ago when she thinks she did something different.  Denies any radiation.  She is active and plays tennis.  Encouraged to stretching muscles before playing tennis.     She is taking a baby aspirin as a preventative.     Review of Systems  Constitutional: No fever or chills  Cardiovascular: no chest pain, no palpitations and no syncope.   Respiratory: no cough, no shortness of breath during exertion and no shortness of breath at rest.   Gastrointestinal: no abdominal pain, no nausea and no vomiting.  Neuro: No Headache, no dizziness  MSK: +back pain    Objective   /86   Pulse 72   Ht 1.575 m (5' 2\")   Wt 51.3 kg (113 lb)   SpO2 98%   BMI 20.67 kg/m²     Physical Exam  Constitutional: Alert and in no acute distress. Well developed, well nourished  Head and Face: Head and face: Normal.    Cardiovascular: Heart rate and rhythm were normal, normal S1 and S2. No peripheral edema.   Pulmonary: No respiratory distress. Clear bilateral breath sounds.  Musculoskeletal: Gait and station: Normal. Muscle strength/tone: Normal. She has tenderness of left lower back on palpation. Elevating both legs reproduced the pain.   Skin: Normal skin color and pigmentation, normal skin turgor, and no rash.    Psychiatric: Judgment and insight: Intact. Mood and affect: Normal.      Lab Results   Component Value Date    WBC 4.9 03/20/2025    HGB 13.9 03/20/2025    HCT 42.0 03/20/2025     03/20/2025    CHOL 235 (H) 03/20/2025    TRIG 72 03/20/2025 "    HDL 78 03/20/2025    ALT 13 03/20/2025    AST 18 03/20/2025     03/20/2025    K 4.3 03/20/2025     03/20/2025    CREATININE 0.80 03/20/2025    BUN 18 03/20/2025    CO2 32 03/20/2025    TSH 4.60 (H) 03/20/2025    HGBA1C 5.4 03/20/2025       BI mammo bilateral screening tomosynthesis  Narrative: Interpreted By:  Sanket Delgado,   STUDY:  BI MAMMO BILATERAL SCREENING TOMOSYNTHESIS; 12/11/2024 3:50 pm      ACCESSION NUMBER(S):  [id number]      ORDERING CLINICIAN:  NISREEN HARVEY      INDICATION:  Screening.      ,C50.911 Malignant neoplasm of unspecified site of right female breast      COMPARISON:  11/30/2023, 09/1922      FINDINGS:  2D and tomosynthesis images were reviewed at 1 mm slice thickness.      Density:  The breasts are extremely dense, which lowers the  sensitivity of mammography.      No suspicious masses or calcifications are identified.      Impression: No mammographic evidence of malignancy.      BI-RADS CATEGORY:  BI-RADS Category:  1 Negative.  Recommendation:  Annual Screening.  Recommended Date:  1 Year.  Laterality:  Bilateral.              For any future breast imaging appointments, please call [phone]  [phone]).          MACRO:  None      Signed by: Sanket Delgado 12/15/2024 4:21 PM  Dictation workstation:   [id number]      Assessment/Plan   Assessment & Plan  Pure hypercholesterolemia  , Lipoprotein A was low. Recommend rosuvastatin 10 mg every other day.   Recheck in 2 months.    Orders:    rosuvastatin (Crestor) 10 mg tablet; Take 1 tablet (10 mg) by mouth every other day.    Follow Up In [facility] - PCP - Established; Future    Comprehensive Metabolic Panel; Future    Lipid Panel; Future    Primary insomnia  Continue trazodone        Vitamin D deficiency  Ordered blood work.  Orders:    Vitamin D 25-Hydroxy,Total (for eval of Vitamin D levels); Future    Vitamin B12 deficiency  Ordered blood work.  Orders:    CBC; Future    Vitamin B12; Future    Abnormal  thyroid exam  Ordered blood work.  Orders:    TSH with reflex to Free T4 if abnormal; Future    Elevated blood sugar  Ordered blood work.  Orders:    Hemoglobin A1C; Future    Other specified menopausal and perimenopausal disorders  DEXA ordered.   Orders:    XR DEXA bone density; Future    Acute left-sided low back pain without sciatica  XR lumbar spine ordered  Take Flexeril 10 mg nightly as prescribed.  Use over-the-counter lidocaine or Salon Pas patches or Voltaren gel.  Apply ice or heat to the area for relief.     Orders:    XR lumbar spine 6+ views including oblique flexion extension; Future    cyclobenzaprine (Flexeril) 10 mg tablet; Take 1 tablet (10 mg) by mouth as needed at bedtime for muscle spasms.      Your yearly Physical is due in: October 2025.   When you call the office for your yearly Physical, please ask them to inform me to order your blood work, so that you can get the fasting blood work before your appointment and we can discuss the results at your physical.      Please call me if any questions arise from now until your next visit. I will call you after I am done seeing patients. A Doctor is always available by phone when the office is closed. Please feel free to call for help with any problem that you feel shouldn't wait until the office re-opens.     I, Rachel Reed MD, attest that this note for 7/25/2025 accurately reflects documentation that my scribe Uziel Villaseñor, made at my direction in my capacity as Rachel Reed MD when I treated Pallavi Wallace.    Scribe Attestation  By signing my name below, Uziel CARPIO Scribe   attest that this documentation has been prepared under the direction and in the presence of Rachel Reed MD.

## 2025-07-25 NOTE — ASSESSMENT & PLAN NOTE
, Lipoprotein A was low. Recommend rosuvastatin 10 mg every other day.   Recheck in 2 months.    Orders:    rosuvastatin (Crestor) 10 mg tablet; Take 1 tablet (10 mg) by mouth every other day.    Follow Up In [facility] - PCP - Established; Future    Comprehensive Metabolic Panel; Future    Lipid Panel; Future

## 2025-09-02 ENCOUNTER — APPOINTMENT (OUTPATIENT)
Dept: RADIOLOGY | Facility: CLINIC | Age: 65
End: 2025-09-02
Payer: COMMERCIAL

## 2025-10-30 ENCOUNTER — APPOINTMENT (OUTPATIENT)
Dept: DERMATOLOGY | Facility: CLINIC | Age: 65
End: 2025-10-30
Payer: COMMERCIAL

## 2025-12-02 ENCOUNTER — APPOINTMENT (OUTPATIENT)
Dept: OBSTETRICS AND GYNECOLOGY | Facility: CLINIC | Age: 65
End: 2025-12-02
Payer: COMMERCIAL

## 2026-02-02 ENCOUNTER — APPOINTMENT (OUTPATIENT)
Dept: OPHTHALMOLOGY | Facility: CLINIC | Age: 66
End: 2026-02-02
Payer: COMMERCIAL